# Patient Record
Sex: FEMALE | Race: WHITE | HISPANIC OR LATINO
[De-identification: names, ages, dates, MRNs, and addresses within clinical notes are randomized per-mention and may not be internally consistent; named-entity substitution may affect disease eponyms.]

---

## 2017-01-11 ENCOUNTER — APPOINTMENT (OUTPATIENT)
Dept: ORTHOPEDIC SURGERY | Facility: CLINIC | Age: 82
End: 2017-01-11

## 2017-01-25 ENCOUNTER — APPOINTMENT (OUTPATIENT)
Dept: ORTHOPEDIC SURGERY | Facility: CLINIC | Age: 82
End: 2017-01-25

## 2017-02-22 ENCOUNTER — APPOINTMENT (OUTPATIENT)
Dept: ORTHOPEDIC SURGERY | Facility: CLINIC | Age: 82
End: 2017-02-22

## 2017-02-22 DIAGNOSIS — S52.124A NONDISPLACED FRACTURE OF HEAD OF RIGHT RADIUS, INITIAL ENCOUNTER FOR CLOSED FRACTURE: ICD-10-CM

## 2017-03-22 ENCOUNTER — APPOINTMENT (OUTPATIENT)
Dept: ORTHOPEDIC SURGERY | Facility: CLINIC | Age: 82
End: 2017-03-22

## 2017-03-22 ENCOUNTER — OUTPATIENT (OUTPATIENT)
Dept: OUTPATIENT SERVICES | Facility: HOSPITAL | Age: 82
LOS: 1 days | Discharge: HOME | End: 2017-03-22

## 2017-06-27 DIAGNOSIS — S52.124A NONDISPLACED FRACTURE OF HEAD OF RIGHT RADIUS, INITIAL ENCOUNTER FOR CLOSED FRACTURE: ICD-10-CM

## 2017-07-27 ENCOUNTER — OUTPATIENT (OUTPATIENT)
Dept: OUTPATIENT SERVICES | Facility: HOSPITAL | Age: 82
LOS: 1 days | Discharge: HOME | End: 2017-07-27

## 2017-07-27 DIAGNOSIS — F01.50 VASCULAR DEMENTIA WITHOUT BEHAVIORAL DISTURBANCE: ICD-10-CM

## 2017-10-23 ENCOUNTER — OUTPATIENT (OUTPATIENT)
Dept: OUTPATIENT SERVICES | Facility: HOSPITAL | Age: 82
LOS: 1 days | Discharge: HOME | End: 2017-10-23

## 2017-10-23 DIAGNOSIS — F01.50 VASCULAR DEMENTIA WITHOUT BEHAVIORAL DISTURBANCE: ICD-10-CM

## 2018-02-12 ENCOUNTER — OUTPATIENT (OUTPATIENT)
Dept: OUTPATIENT SERVICES | Facility: HOSPITAL | Age: 83
LOS: 1 days | Discharge: HOME | End: 2018-02-12

## 2018-02-12 DIAGNOSIS — F01.50 VASCULAR DEMENTIA WITHOUT BEHAVIORAL DISTURBANCE: ICD-10-CM

## 2018-04-09 ENCOUNTER — OUTPATIENT (OUTPATIENT)
Dept: OUTPATIENT SERVICES | Facility: HOSPITAL | Age: 83
LOS: 1 days | Discharge: HOME | End: 2018-04-09

## 2018-04-09 DIAGNOSIS — F01.50 VASCULAR DEMENTIA WITHOUT BEHAVIORAL DISTURBANCE: ICD-10-CM

## 2018-07-23 ENCOUNTER — OUTPATIENT (OUTPATIENT)
Dept: OUTPATIENT SERVICES | Facility: HOSPITAL | Age: 83
LOS: 1 days | Discharge: HOME | End: 2018-07-23

## 2018-07-23 DIAGNOSIS — F02.81 DEMENTIA IN OTHER DISEASES CLASSIFIED ELSEWHERE, UNSPECIFIED SEVERITY, WITH BEHAVIORAL DISTURBANCE: ICD-10-CM

## 2018-07-23 DIAGNOSIS — G30.1 ALZHEIMER'S DISEASE WITH LATE ONSET: ICD-10-CM

## 2018-09-04 ENCOUNTER — EMERGENCY (EMERGENCY)
Facility: HOSPITAL | Age: 83
LOS: 1 days | Discharge: HOME | End: 2018-09-04
Attending: EMERGENCY MEDICINE

## 2018-09-04 VITALS
SYSTOLIC BLOOD PRESSURE: 132 MMHG | RESPIRATION RATE: 18 BRPM | TEMPERATURE: 98 F | OXYGEN SATURATION: 99 % | HEART RATE: 70 BPM | DIASTOLIC BLOOD PRESSURE: 59 MMHG

## 2018-09-04 DIAGNOSIS — I10 ESSENTIAL (PRIMARY) HYPERTENSION: ICD-10-CM

## 2018-09-04 DIAGNOSIS — R11.2 NAUSEA WITH VOMITING, UNSPECIFIED: ICD-10-CM

## 2018-09-04 DIAGNOSIS — R19.7 DIARRHEA, UNSPECIFIED: ICD-10-CM

## 2018-09-04 DIAGNOSIS — F03.90 UNSPECIFIED DEMENTIA WITHOUT BEHAVIORAL DISTURBANCE: ICD-10-CM

## 2018-09-04 DIAGNOSIS — R51 HEADACHE: ICD-10-CM

## 2018-09-04 DIAGNOSIS — E11.9 TYPE 2 DIABETES MELLITUS WITHOUT COMPLICATIONS: ICD-10-CM

## 2018-09-04 DIAGNOSIS — N39.0 URINARY TRACT INFECTION, SITE NOT SPECIFIED: ICD-10-CM

## 2018-09-04 DIAGNOSIS — R05 COUGH: ICD-10-CM

## 2018-09-04 RX ORDER — SODIUM CHLORIDE 9 MG/ML
1000 INJECTION INTRAMUSCULAR; INTRAVENOUS; SUBCUTANEOUS
Refills: 0 | Status: DISCONTINUED | OUTPATIENT
Start: 2018-09-04 | End: 2018-09-10

## 2018-09-04 RX ORDER — ACETAMINOPHEN 500 MG
650 TABLET ORAL ONCE
Refills: 0 | Status: COMPLETED | OUTPATIENT
Start: 2018-09-04 | End: 2018-09-04

## 2018-09-04 NOTE — ED PROVIDER NOTE - NS ED ROS FT
Constitutional: (-) fever, (-) chills, (-) weight loss  Eyes/ENT: (-) blurry vision, (-) epistaxis  Cardiovascular: (-) chest pain, (-) syncope  Respiratory: (+)mild non-productive cough, (-) shortness of breath  Gastrointestinal: (+) nausea/ vomiting, (+) diarrhea, (-) abdominal pain  :(-) dysuria or hematuria  Musculoskeletal: (-) neck pain, (-) back pain, (-) joint pain  Integumentary: (-) rash, (-) edema  Neurological: (+) mild headache, (-) altered mental status  Psychiatric: (-) hallucinations  Allergic/Immunologic: (-) pruritus

## 2018-09-04 NOTE — ED PROVIDER NOTE - PHYSICAL EXAMINATION
Vital Signs: I have reviewed the initial vital signs.  Constitutional: well-nourished, appears stated age, no acute distress, smiling and pleasant  HEENT: PERRL, mmm, nml phonation  Cardiovascular: regular rate, regular rhythm, well-perfused extremities, distal pulses intact  Respiratory: unlabored respiratory effort, b/l dry inspiratory crackles, speaking full sentences  Gastrointestinal: soft, non-tender abdomen, no pulsatile mass, no CVA ttp, BS present in all quadrants  Musculoskeletal: supple neck, no lower extremity edema, no midline spine ttp, FROM at all joints  Integumentary: warm, dry, no rash  Neurologic: awake, alert, cranial nerves II-XII grossly intact, extremities’ motor and sensory functions grossly intact,  Psychiatric: appropriate mood, appropriate affect, smiling, cooperative, making jokes

## 2018-09-04 NOTE — ED PROVIDER NOTE - PROGRESS NOTE DETAILS
No episoeds of vomiting in ED, patient tolerated PO, found to have UTI ( straight cath specimen); results of all testgs were d/w the pfa,desiree, copies of tests were given to her,  Instructed to follow up with PMD in 2-3 days, strict return precautions given.  They verbalized understanding and are amenable with the plan.  Given opportunity to ask questions.

## 2018-09-04 NOTE — ED PROVIDER NOTE - OBJECTIVE STATEMENT
89 yo female h/o HTN, DM, dementia, UTI about 3 months ago brought here by her family for evaluation of vomiting,  According to family patient has been in her usual state of health until yesterday when she became a little less active and not as hungry.  This morning had a small breakfast and in the afternoon had Glucerna with her evening medications, shortly thereafter developed nausea and had an episode of NBNB emesis.  in addition, family reports she had a few loose BM today.  Patient reports mild headache, but denies fever, chills, SOB, CP, SOB, abdominal or back pain, no focal weakness or paresthesias.  As per family  she is ate her baseline mental status.  Will check labs, give fluids, ECg, reassess.  Family is amenable with the plan.

## 2018-09-04 NOTE — ED PROVIDER NOTE - MEDICAL DECISION MAKING DETAILS
89 yo female with 1 episode of emesis at home, found to have UTI ,  Stable during a period of observation, d/c home with family in a stable condition.

## 2018-09-05 LAB
ALBUMIN SERPL ELPH-MCNC: 4.5 G/DL — SIGNIFICANT CHANGE UP (ref 3.5–5.2)
ALP SERPL-CCNC: 99 U/L — SIGNIFICANT CHANGE UP (ref 30–115)
ALT FLD-CCNC: 30 U/L — SIGNIFICANT CHANGE UP (ref 0–41)
ANION GAP SERPL CALC-SCNC: 19 MMOL/L — HIGH (ref 7–14)
APPEARANCE UR: ABNORMAL
AST SERPL-CCNC: 42 U/L — HIGH (ref 0–41)
BACTERIA # UR AUTO: ABNORMAL /HPF
BASOPHILS # BLD AUTO: 0.06 K/UL — SIGNIFICANT CHANGE UP (ref 0–0.2)
BASOPHILS NFR BLD AUTO: 0.6 % — SIGNIFICANT CHANGE UP (ref 0–1)
BILIRUB DIRECT SERPL-MCNC: <0.2 MG/DL — SIGNIFICANT CHANGE UP (ref 0–0.2)
BILIRUB INDIRECT FLD-MCNC: >0.2 MG/DL — SIGNIFICANT CHANGE UP (ref 0.2–1.2)
BILIRUB SERPL-MCNC: 0.4 MG/DL — SIGNIFICANT CHANGE UP (ref 0.2–1.2)
BILIRUB UR-MCNC: NEGATIVE — SIGNIFICANT CHANGE UP
BUN SERPL-MCNC: 26 MG/DL — HIGH (ref 10–20)
CALCIUM SERPL-MCNC: 9.8 MG/DL — SIGNIFICANT CHANGE UP (ref 8.5–10.1)
CHLORIDE SERPL-SCNC: 96 MMOL/L — LOW (ref 98–110)
CO2 SERPL-SCNC: 22 MMOL/L — SIGNIFICANT CHANGE UP (ref 17–32)
COLOR SPEC: YELLOW — SIGNIFICANT CHANGE UP
CREAT SERPL-MCNC: 1.1 MG/DL — SIGNIFICANT CHANGE UP (ref 0.7–1.5)
DIFF PNL FLD: ABNORMAL
EOSINOPHIL # BLD AUTO: 0.11 K/UL — SIGNIFICANT CHANGE UP (ref 0–0.7)
EOSINOPHIL NFR BLD AUTO: 1.1 % — SIGNIFICANT CHANGE UP (ref 0–8)
EPI CELLS # UR: ABNORMAL /HPF
GLUCOSE SERPL-MCNC: 113 MG/DL — HIGH (ref 70–99)
GLUCOSE UR QL: NEGATIVE MG/DL — SIGNIFICANT CHANGE UP
HCT VFR BLD CALC: 35.7 % — LOW (ref 37–47)
HGB BLD-MCNC: 11.6 G/DL — LOW (ref 12–16)
IMM GRANULOCYTES NFR BLD AUTO: 0.7 % — HIGH (ref 0.1–0.3)
KETONES UR-MCNC: NEGATIVE — SIGNIFICANT CHANGE UP
LEUKOCYTE ESTERASE UR-ACNC: ABNORMAL
LIDOCAIN IGE QN: 63 U/L — HIGH (ref 7–60)
LYMPHOCYTES # BLD AUTO: 2.97 K/UL — SIGNIFICANT CHANGE UP (ref 1.2–3.4)
LYMPHOCYTES # BLD AUTO: 28.5 % — SIGNIFICANT CHANGE UP (ref 20.5–51.1)
MCHC RBC-ENTMCNC: 28.3 PG — SIGNIFICANT CHANGE UP (ref 27–31)
MCHC RBC-ENTMCNC: 32.5 G/DL — SIGNIFICANT CHANGE UP (ref 32–37)
MCV RBC AUTO: 87.1 FL — SIGNIFICANT CHANGE UP (ref 81–99)
MONOCYTES # BLD AUTO: 0.83 K/UL — HIGH (ref 0.1–0.6)
MONOCYTES NFR BLD AUTO: 8 % — SIGNIFICANT CHANGE UP (ref 1.7–9.3)
NEUTROPHILS # BLD AUTO: 6.39 K/UL — SIGNIFICANT CHANGE UP (ref 1.4–6.5)
NEUTROPHILS NFR BLD AUTO: 61.1 % — SIGNIFICANT CHANGE UP (ref 42.2–75.2)
NITRITE UR-MCNC: NEGATIVE — SIGNIFICANT CHANGE UP
NRBC # BLD: 0 /100 WBCS — SIGNIFICANT CHANGE UP (ref 0–0)
PH UR: 6 — SIGNIFICANT CHANGE UP (ref 5–8)
PLATELET # BLD AUTO: 187 K/UL — SIGNIFICANT CHANGE UP (ref 130–400)
POTASSIUM SERPL-MCNC: 5.2 MMOL/L — HIGH (ref 3.5–5)
POTASSIUM SERPL-SCNC: 5.2 MMOL/L — HIGH (ref 3.5–5)
PROT SERPL-MCNC: 7.9 G/DL — SIGNIFICANT CHANGE UP (ref 6–8)
PROT UR-MCNC: ABNORMAL MG/DL
RBC # BLD: 4.1 M/UL — LOW (ref 4.2–5.4)
RBC # FLD: 15.4 % — HIGH (ref 11.5–14.5)
RBC CASTS # UR COMP ASSIST: SIGNIFICANT CHANGE UP /HPF
SODIUM SERPL-SCNC: 137 MMOL/L — SIGNIFICANT CHANGE UP (ref 135–146)
SP GR SPEC: 1.02 — SIGNIFICANT CHANGE UP (ref 1.01–1.03)
TROPONIN T SERPL-MCNC: <0.01 NG/ML — SIGNIFICANT CHANGE UP
UROBILINOGEN FLD QL: 1 MG/DL (ref 0.2–0.2)
WBC # BLD: 10.43 K/UL — SIGNIFICANT CHANGE UP (ref 4.8–10.8)
WBC # FLD AUTO: 10.43 K/UL — SIGNIFICANT CHANGE UP (ref 4.8–10.8)
WBC UR QL: >50 /HPF

## 2018-09-05 RX ORDER — CEFPODOXIME PROXETIL 100 MG
1 TABLET ORAL
Qty: 14 | Refills: 0
Start: 2018-09-05 | End: 2018-09-11

## 2018-09-05 RX ADMIN — Medication 650 MILLIGRAM(S): at 01:00

## 2018-09-05 RX ADMIN — SODIUM CHLORIDE 100 MILLILITER(S): 9 INJECTION INTRAMUSCULAR; INTRAVENOUS; SUBCUTANEOUS at 01:11

## 2018-09-05 NOTE — ED ADULT NURSE NOTE - NSIMPLEMENTINTERV_GEN_ALL_ED
Implemented All Fall with Harm Risk Interventions:  [place] to call system. Call bell, personal items and telephone within reach. Instruct patient to call for assistance. Room bathroom lighting operational. Non-slip footwear when patient is off stretcher. Physically safe environment: no spills, clutter or unnecessary equipment. Stretcher in lowest position, wheels locked, appropriate side rails in place. Provide visual cue, wrist band, yellow gown, etc. Monitor gait and stability. Monitor for mental status changes and reorient to person, place, and time. Review medications for side effects contributing to fall risk. Reinforce activity limits and safety measures with patient and family. Provide visual clues: red socks.

## 2018-09-05 NOTE — ED ADULT NURSE REASSESSMENT NOTE - NS ED NURSE REASSESS COMMENT FT1
pt combative. unable to straight cath. bed pan commode provided. pt refusing to use. er md aware. cbc drawn and sent

## 2018-11-12 ENCOUNTER — OUTPATIENT (OUTPATIENT)
Dept: OUTPATIENT SERVICES | Facility: HOSPITAL | Age: 83
LOS: 1 days | Discharge: HOME | End: 2018-11-12

## 2018-11-12 DIAGNOSIS — G30.1 ALZHEIMER'S DISEASE WITH LATE ONSET: ICD-10-CM

## 2018-11-12 DIAGNOSIS — F02.81 DEMENTIA IN OTHER DISEASES CLASSIFIED ELSEWHERE, UNSPECIFIED SEVERITY, WITH BEHAVIORAL DISTURBANCE: ICD-10-CM

## 2019-01-01 ENCOUNTER — APPOINTMENT (OUTPATIENT)
Dept: OBGYN | Facility: CLINIC | Age: 84
End: 2019-01-01
Payer: MEDICARE

## 2019-01-01 ENCOUNTER — OUTPATIENT (OUTPATIENT)
Dept: OUTPATIENT SERVICES | Facility: HOSPITAL | Age: 84
LOS: 1 days | Discharge: HOME | End: 2019-01-01
Payer: COMMERCIAL

## 2019-01-01 VITALS
RESPIRATION RATE: 16 BRPM | HEIGHT: 56 IN | HEART RATE: 85 BPM | WEIGHT: 92 LBS | BODY MASS INDEX: 20.7 KG/M2 | DIASTOLIC BLOOD PRESSURE: 65 MMHG | SYSTOLIC BLOOD PRESSURE: 139 MMHG

## 2019-01-01 DIAGNOSIS — B37.3 CANDIDIASIS OF VULVA AND VAGINA: ICD-10-CM

## 2019-01-01 DIAGNOSIS — Z86.39 PERSONAL HISTORY OF OTHER ENDOCRINE, NUTRITIONAL AND METABOLIC DISEASE: ICD-10-CM

## 2019-01-01 DIAGNOSIS — N89.8 OTHER SPECIFIED NONINFLAMMATORY DISORDERS OF VAGINA: ICD-10-CM

## 2019-01-01 DIAGNOSIS — F02.81 DEMENTIA IN OTHER DISEASES CLASSIFIED ELSEWHERE, UNSPECIFIED SEVERITY, WITH BEHAVIORAL DISTURBANCE: ICD-10-CM

## 2019-01-01 DIAGNOSIS — Z86.79 PERSONAL HISTORY OF OTHER DISEASES OF THE CIRCULATORY SYSTEM: ICD-10-CM

## 2019-01-01 DIAGNOSIS — G30.1 ALZHEIMER'S DISEASE WITH LATE ONSET: ICD-10-CM

## 2019-01-01 PROCEDURE — 99203 OFFICE O/P NEW LOW 30 MIN: CPT

## 2019-01-01 PROCEDURE — 99213 OFFICE O/P EST LOW 20 MIN: CPT

## 2019-01-01 RX ORDER — FLUCONAZOLE 150 MG/1
150 TABLET ORAL DAILY
Qty: 1 | Refills: 0 | Status: ACTIVE | COMMUNITY
Start: 2019-01-01 | End: 1900-01-01

## 2019-02-05 ENCOUNTER — OUTPATIENT (OUTPATIENT)
Dept: OUTPATIENT SERVICES | Facility: HOSPITAL | Age: 84
LOS: 1 days | Discharge: HOME | End: 2019-02-05

## 2019-02-05 DIAGNOSIS — G30.1 ALZHEIMER'S DISEASE WITH LATE ONSET: ICD-10-CM

## 2019-02-05 DIAGNOSIS — F02.81 DEMENTIA IN OTHER DISEASES CLASSIFIED ELSEWHERE, UNSPECIFIED SEVERITY, WITH BEHAVIORAL DISTURBANCE: ICD-10-CM

## 2019-10-01 PROBLEM — N89.8 DISCHARGE FROM THE VAGINA: Status: ACTIVE | Noted: 2019-01-01

## 2019-10-01 PROBLEM — Z86.79 HISTORY OF CHRONIC HYPERTENSION: Status: RESOLVED | Noted: 2019-01-01 | Resolved: 2019-01-01

## 2019-10-01 PROBLEM — Z86.39 HISTORY OF TYPE 2 DIABETES MELLITUS: Status: RESOLVED | Noted: 2019-01-01 | Resolved: 2019-01-01

## 2019-10-01 NOTE — CHIEF COMPLAINT
[Initial Visit] : initial GYN visit [FreeTextEntry1] : JAROCHO TRACEY is a 89 year female\par with possible discharge.

## 2020-01-01 ENCOUNTER — INPATIENT (INPATIENT)
Facility: HOSPITAL | Age: 85
LOS: 7 days | End: 2020-05-03
Attending: INTERNAL MEDICINE | Admitting: INTERNAL MEDICINE
Payer: MEDICAID

## 2020-01-01 VITALS
OXYGEN SATURATION: 83 % | TEMPERATURE: 101 F | HEART RATE: 115 BPM | RESPIRATION RATE: 40 BRPM | SYSTOLIC BLOOD PRESSURE: 102 MMHG | DIASTOLIC BLOOD PRESSURE: 55 MMHG

## 2020-01-01 VITALS
DIASTOLIC BLOOD PRESSURE: 49 MMHG | HEART RATE: 82 BPM | SYSTOLIC BLOOD PRESSURE: 112 MMHG | RESPIRATION RATE: 19 BRPM | TEMPERATURE: 97 F | OXYGEN SATURATION: 100 %

## 2020-01-01 DIAGNOSIS — Z98.49 CATARACT EXTRACTION STATUS, UNSPECIFIED EYE: Chronic | ICD-10-CM

## 2020-01-01 DIAGNOSIS — N18.3 CHRONIC KIDNEY DISEASE, STAGE 3 (MODERATE): ICD-10-CM

## 2020-01-01 LAB
ALBUMIN SERPL ELPH-MCNC: 1.9 G/DL — LOW (ref 3.5–5.2)
ALBUMIN SERPL ELPH-MCNC: 1.9 G/DL — LOW (ref 3.5–5.2)
ALBUMIN SERPL ELPH-MCNC: 2.2 G/DL — LOW (ref 3.5–5.2)
ALBUMIN SERPL ELPH-MCNC: 2.4 G/DL — LOW (ref 3.5–5.2)
ALBUMIN SERPL ELPH-MCNC: 2.5 G/DL — LOW (ref 3.5–5.2)
ALBUMIN SERPL ELPH-MCNC: 2.8 G/DL — LOW (ref 3.5–5.2)
ALBUMIN SERPL ELPH-MCNC: 2.9 G/DL — LOW (ref 3.5–5.2)
ALBUMIN SERPL ELPH-MCNC: 3.1 G/DL — LOW (ref 3.5–5.2)
ALBUMIN SERPL ELPH-MCNC: 4.1 G/DL — SIGNIFICANT CHANGE UP (ref 3.5–5.2)
ALLERGY+IMMUNOLOGY DIAG STUDY NOTE: SIGNIFICANT CHANGE UP
ALP SERPL-CCNC: 108 U/L — SIGNIFICANT CHANGE UP (ref 30–115)
ALP SERPL-CCNC: 121 U/L — HIGH (ref 30–115)
ALP SERPL-CCNC: 137 U/L — HIGH (ref 30–115)
ALP SERPL-CCNC: 76 U/L — SIGNIFICANT CHANGE UP (ref 30–115)
ALP SERPL-CCNC: 79 U/L — SIGNIFICANT CHANGE UP (ref 30–115)
ALP SERPL-CCNC: 81 U/L — SIGNIFICANT CHANGE UP (ref 30–115)
ALP SERPL-CCNC: 83 U/L — SIGNIFICANT CHANGE UP (ref 30–115)
ALP SERPL-CCNC: 93 U/L — SIGNIFICANT CHANGE UP (ref 30–115)
ALP SERPL-CCNC: 97 U/L — SIGNIFICANT CHANGE UP (ref 30–115)
ALP SERPL-CCNC: 97 U/L — SIGNIFICANT CHANGE UP (ref 30–115)
ALP SERPL-CCNC: 98 U/L — SIGNIFICANT CHANGE UP (ref 30–115)
ALT FLD-CCNC: 103 U/L — HIGH (ref 0–41)
ALT FLD-CCNC: 122 U/L — HIGH (ref 0–41)
ALT FLD-CCNC: 191 U/L — HIGH (ref 0–41)
ALT FLD-CCNC: 52 U/L — HIGH (ref 0–41)
ALT FLD-CCNC: 64 U/L — HIGH (ref 0–41)
ALT FLD-CCNC: 71 U/L — HIGH (ref 0–41)
ALT FLD-CCNC: 73 U/L — HIGH (ref 0–41)
ALT FLD-CCNC: 75 U/L — HIGH (ref 0–41)
ALT FLD-CCNC: 77 U/L — HIGH (ref 0–41)
ALT FLD-CCNC: 92 U/L — HIGH (ref 0–41)
ALT FLD-CCNC: 95 U/L — HIGH (ref 0–41)
ANA TITR SER: NEGATIVE — SIGNIFICANT CHANGE UP
ANION GAP SERPL CALC-SCNC: 11 MMOL/L — SIGNIFICANT CHANGE UP (ref 7–14)
ANION GAP SERPL CALC-SCNC: 14 MMOL/L — SIGNIFICANT CHANGE UP (ref 7–14)
ANION GAP SERPL CALC-SCNC: 15 MMOL/L — HIGH (ref 7–14)
ANION GAP SERPL CALC-SCNC: 15 MMOL/L — HIGH (ref 7–14)
ANION GAP SERPL CALC-SCNC: 16 MMOL/L — HIGH (ref 7–14)
ANION GAP SERPL CALC-SCNC: 17 MMOL/L — HIGH (ref 7–14)
ANION GAP SERPL CALC-SCNC: 17 MMOL/L — HIGH (ref 7–14)
ANION GAP SERPL CALC-SCNC: 19 MMOL/L — HIGH (ref 7–14)
ANION GAP SERPL CALC-SCNC: 20 MMOL/L — HIGH (ref 7–14)
ANION GAP SERPL CALC-SCNC: 20 MMOL/L — HIGH (ref 7–14)
ANION GAP SERPL CALC-SCNC: 21 MMOL/L — HIGH (ref 7–14)
ANION GAP SERPL CALC-SCNC: 27 MMOL/L — HIGH (ref 7–14)
ANION GAP SERPL CALC-SCNC: 35 MMOL/L — HIGH (ref 7–14)
APAP SERPL-MCNC: <5 UG/ML — LOW (ref 10–30)
APPEARANCE UR: CLEAR — SIGNIFICANT CHANGE UP
APTT BLD: 21.7 SEC — CRITICAL LOW (ref 27–39.2)
APTT BLD: 29.5 SEC — SIGNIFICANT CHANGE UP (ref 27–39.2)
APTT BLD: 31.2 SEC — SIGNIFICANT CHANGE UP (ref 27–39.2)
APTT BLD: 35.2 SEC — SIGNIFICANT CHANGE UP (ref 27–39.2)
APTT BLD: 48.1 SEC — HIGH (ref 27–39.2)
APTT BLD: 53.1 SEC — HIGH (ref 27–39.2)
AST SERPL-CCNC: 104 U/L — HIGH (ref 0–41)
AST SERPL-CCNC: 113 U/L — HIGH (ref 0–41)
AST SERPL-CCNC: 118 U/L — HIGH (ref 0–41)
AST SERPL-CCNC: 135 U/L — HIGH (ref 0–41)
AST SERPL-CCNC: 138 U/L — HIGH (ref 0–41)
AST SERPL-CCNC: 144 U/L — HIGH (ref 0–41)
AST SERPL-CCNC: 157 U/L — HIGH (ref 0–41)
AST SERPL-CCNC: 169 U/L — HIGH (ref 0–41)
AST SERPL-CCNC: 205 U/L — HIGH (ref 0–41)
AST SERPL-CCNC: 287 U/L — HIGH (ref 0–41)
AST SERPL-CCNC: 67 U/L — HIGH (ref 0–41)
B-OH-BUTYR SERPL-SCNC: 0.4 MMOL/L — SIGNIFICANT CHANGE UP
BACTERIA # UR AUTO: NEGATIVE — SIGNIFICANT CHANGE UP
BASE EXCESS BLDA CALC-SCNC: 2.4 MMOL/L — HIGH (ref -2–2)
BASOPHILS # BLD AUTO: 0 K/UL — SIGNIFICANT CHANGE UP (ref 0–0.2)
BASOPHILS # BLD AUTO: 0.01 K/UL — SIGNIFICANT CHANGE UP (ref 0–0.2)
BASOPHILS # BLD AUTO: 0.02 K/UL — SIGNIFICANT CHANGE UP (ref 0–0.2)
BASOPHILS # BLD AUTO: 0.02 K/UL — SIGNIFICANT CHANGE UP (ref 0–0.2)
BASOPHILS # BLD AUTO: 0.03 K/UL — SIGNIFICANT CHANGE UP (ref 0–0.2)
BASOPHILS # BLD AUTO: 0.04 K/UL — SIGNIFICANT CHANGE UP (ref 0–0.2)
BASOPHILS # BLD AUTO: 0.08 K/UL — SIGNIFICANT CHANGE UP (ref 0–0.2)
BASOPHILS NFR BLD AUTO: 0 % — SIGNIFICANT CHANGE UP (ref 0–1)
BASOPHILS NFR BLD AUTO: 0.1 % — SIGNIFICANT CHANGE UP (ref 0–1)
BASOPHILS NFR BLD AUTO: 0.2 % — SIGNIFICANT CHANGE UP (ref 0–1)
BASOPHILS NFR BLD AUTO: 0.3 % — SIGNIFICANT CHANGE UP (ref 0–1)
BASOPHILS NFR BLD AUTO: 0.5 % — SIGNIFICANT CHANGE UP (ref 0–1)
BASOPHILS NFR BLD AUTO: 0.6 % — SIGNIFICANT CHANGE UP (ref 0–1)
BILIRUB SERPL-MCNC: 0.5 MG/DL — SIGNIFICANT CHANGE UP (ref 0.2–1.2)
BILIRUB SERPL-MCNC: 0.5 MG/DL — SIGNIFICANT CHANGE UP (ref 0.2–1.2)
BILIRUB SERPL-MCNC: 0.6 MG/DL — SIGNIFICANT CHANGE UP (ref 0.2–1.2)
BILIRUB SERPL-MCNC: 0.7 MG/DL — SIGNIFICANT CHANGE UP (ref 0.2–1.2)
BILIRUB SERPL-MCNC: 0.7 MG/DL — SIGNIFICANT CHANGE UP (ref 0.2–1.2)
BILIRUB SERPL-MCNC: 1.1 MG/DL — SIGNIFICANT CHANGE UP (ref 0.2–1.2)
BILIRUB UR-MCNC: NEGATIVE — SIGNIFICANT CHANGE UP
BLD GP AB SCN SERPL QL: SIGNIFICANT CHANGE UP
BUN SERPL-MCNC: 103 MG/DL — CRITICAL HIGH (ref 10–20)
BUN SERPL-MCNC: 106 MG/DL — CRITICAL HIGH (ref 10–20)
BUN SERPL-MCNC: 114 MG/DL — CRITICAL HIGH (ref 10–20)
BUN SERPL-MCNC: 114 MG/DL — CRITICAL HIGH (ref 10–20)
BUN SERPL-MCNC: 117 MG/DL — CRITICAL HIGH (ref 10–20)
BUN SERPL-MCNC: 30 MG/DL — HIGH (ref 10–20)
BUN SERPL-MCNC: 35 MG/DL — HIGH (ref 10–20)
BUN SERPL-MCNC: 43 MG/DL — HIGH (ref 10–20)
BUN SERPL-MCNC: 43 MG/DL — HIGH (ref 10–20)
BUN SERPL-MCNC: 48 MG/DL — HIGH (ref 10–20)
BUN SERPL-MCNC: 57 MG/DL — HIGH (ref 10–20)
BUN SERPL-MCNC: 59 MG/DL — HIGH (ref 10–20)
BUN SERPL-MCNC: 59 MG/DL — HIGH (ref 10–20)
BUN SERPL-MCNC: 69 MG/DL — CRITICAL HIGH (ref 10–20)
BUN SERPL-MCNC: 80 MG/DL — CRITICAL HIGH (ref 10–20)
CALCIUM SERPL-MCNC: 7.2 MG/DL — LOW (ref 8.5–10.1)
CALCIUM SERPL-MCNC: 7.2 MG/DL — LOW (ref 8.5–10.1)
CALCIUM SERPL-MCNC: 7.3 MG/DL — LOW (ref 8.5–10.1)
CALCIUM SERPL-MCNC: 7.5 MG/DL — LOW (ref 8.5–10.1)
CALCIUM SERPL-MCNC: 7.5 MG/DL — LOW (ref 8.5–10.1)
CALCIUM SERPL-MCNC: 7.8 MG/DL — LOW (ref 8.5–10.1)
CALCIUM SERPL-MCNC: 8 MG/DL — LOW (ref 8.5–10.1)
CALCIUM SERPL-MCNC: 8.4 MG/DL — LOW (ref 8.5–10.1)
CALCIUM SERPL-MCNC: 8.6 MG/DL — SIGNIFICANT CHANGE UP (ref 8.5–10.1)
CALCIUM SERPL-MCNC: 9 MG/DL — SIGNIFICANT CHANGE UP (ref 8.5–10.1)
CALCIUM SERPL-MCNC: 9.1 MG/DL — SIGNIFICANT CHANGE UP (ref 8.5–10.1)
CALCIUM SERPL-MCNC: 9.5 MG/DL — SIGNIFICANT CHANGE UP (ref 8.5–10.1)
CERULOPLASMIN SERPL-MCNC: 32 MG/DL — SIGNIFICANT CHANGE UP (ref 16–45)
CHLORIDE SERPL-SCNC: 112 MMOL/L — HIGH (ref 98–110)
CHLORIDE SERPL-SCNC: 117 MMOL/L — HIGH (ref 98–110)
CHLORIDE SERPL-SCNC: 118 MMOL/L — HIGH (ref 98–110)
CHLORIDE SERPL-SCNC: 119 MMOL/L — HIGH (ref 98–110)
CHLORIDE SERPL-SCNC: 119 MMOL/L — HIGH (ref 98–110)
CHLORIDE SERPL-SCNC: 120 MMOL/L — HIGH (ref 98–110)
CHLORIDE SERPL-SCNC: 122 MMOL/L — HIGH (ref 98–110)
CK MB CFR SERPL CALC: 10.8 NG/ML — HIGH (ref 0.6–6.3)
CK SERPL-CCNC: 1184 U/L — HIGH (ref 0–225)
CO2 SERPL-SCNC: 17 MMOL/L — SIGNIFICANT CHANGE UP (ref 17–32)
CO2 SERPL-SCNC: 17 MMOL/L — SIGNIFICANT CHANGE UP (ref 17–32)
CO2 SERPL-SCNC: 19 MMOL/L — SIGNIFICANT CHANGE UP (ref 17–32)
CO2 SERPL-SCNC: 20 MMOL/L — SIGNIFICANT CHANGE UP (ref 17–32)
CO2 SERPL-SCNC: 20 MMOL/L — SIGNIFICANT CHANGE UP (ref 17–32)
CO2 SERPL-SCNC: 21 MMOL/L — SIGNIFICANT CHANGE UP (ref 17–32)
CO2 SERPL-SCNC: 25 MMOL/L — SIGNIFICANT CHANGE UP (ref 17–32)
CO2 SERPL-SCNC: 26 MMOL/L — SIGNIFICANT CHANGE UP (ref 17–32)
CO2 SERPL-SCNC: 27 MMOL/L — SIGNIFICANT CHANGE UP (ref 17–32)
COLOR SPEC: YELLOW — SIGNIFICANT CHANGE UP
CREAT ?TM UR-MCNC: 95 MG/DL — SIGNIFICANT CHANGE UP
CREAT SERPL-MCNC: 0.8 MG/DL — SIGNIFICANT CHANGE UP (ref 0.7–1.5)
CREAT SERPL-MCNC: 0.9 MG/DL — SIGNIFICANT CHANGE UP (ref 0.7–1.5)
CREAT SERPL-MCNC: 1 MG/DL — SIGNIFICANT CHANGE UP (ref 0.7–1.5)
CREAT SERPL-MCNC: 1.1 MG/DL — SIGNIFICANT CHANGE UP (ref 0.7–1.5)
CREAT SERPL-MCNC: 1.5 MG/DL — SIGNIFICANT CHANGE UP (ref 0.7–1.5)
CREAT SERPL-MCNC: 1.7 MG/DL — HIGH (ref 0.7–1.5)
CREAT SERPL-MCNC: 1.9 MG/DL — HIGH (ref 0.7–1.5)
CREAT SERPL-MCNC: 2.1 MG/DL — HIGH (ref 0.7–1.5)
CREAT SERPL-MCNC: 2.8 MG/DL — HIGH (ref 0.7–1.5)
CRP SERPL-MCNC: 0.84 MG/DL — HIGH (ref 0–0.4)
CULTURE RESULTS: NO GROWTH — SIGNIFICANT CHANGE UP
CULTURE RESULTS: SIGNIFICANT CHANGE UP
CULTURE RESULTS: SIGNIFICANT CHANGE UP
DIFF PNL FLD: ABNORMAL
DIR ANTIGLOB POLYSPECIFIC INTERPRETATION: SIGNIFICANT CHANGE UP
EOSINOPHIL # BLD AUTO: 0 K/UL — SIGNIFICANT CHANGE UP (ref 0–0.7)
EOSINOPHIL # BLD AUTO: 0.01 K/UL — SIGNIFICANT CHANGE UP (ref 0–0.7)
EOSINOPHIL NFR BLD AUTO: 0 % — SIGNIFICANT CHANGE UP (ref 0–8)
EOSINOPHIL NFR BLD AUTO: 0.1 % — SIGNIFICANT CHANGE UP (ref 0–8)
EOSINOPHIL NFR BLD AUTO: 0.2 % — SIGNIFICANT CHANGE UP (ref 0–8)
EPI CELLS # UR: 6 /HPF — HIGH (ref 0–5)
FERRITIN SERPL-MCNC: 676 NG/ML — HIGH (ref 15–150)
FIBRINOGEN PPP-MCNC: 575 MG/DL — HIGH (ref 204.4–570.6)
GAS PNL BLDV: SIGNIFICANT CHANGE UP
GLUCOSE BLDC GLUCOMTR-MCNC: 103 MG/DL — HIGH (ref 70–99)
GLUCOSE BLDC GLUCOMTR-MCNC: 125 MG/DL — HIGH (ref 70–99)
GLUCOSE BLDC GLUCOMTR-MCNC: 128 MG/DL — HIGH (ref 70–99)
GLUCOSE BLDC GLUCOMTR-MCNC: 147 MG/DL — HIGH (ref 70–99)
GLUCOSE BLDC GLUCOMTR-MCNC: 165 MG/DL — HIGH (ref 70–99)
GLUCOSE BLDC GLUCOMTR-MCNC: 170 MG/DL — HIGH (ref 70–99)
GLUCOSE BLDC GLUCOMTR-MCNC: 176 MG/DL — HIGH (ref 70–99)
GLUCOSE BLDC GLUCOMTR-MCNC: 179 MG/DL — HIGH (ref 70–99)
GLUCOSE BLDC GLUCOMTR-MCNC: 180 MG/DL — HIGH (ref 70–99)
GLUCOSE BLDC GLUCOMTR-MCNC: 188 MG/DL — HIGH (ref 70–99)
GLUCOSE BLDC GLUCOMTR-MCNC: 188 MG/DL — HIGH (ref 70–99)
GLUCOSE BLDC GLUCOMTR-MCNC: 189 MG/DL — HIGH (ref 70–99)
GLUCOSE BLDC GLUCOMTR-MCNC: 192 MG/DL — HIGH (ref 70–99)
GLUCOSE BLDC GLUCOMTR-MCNC: 193 MG/DL — HIGH (ref 70–99)
GLUCOSE BLDC GLUCOMTR-MCNC: 214 MG/DL — HIGH (ref 70–99)
GLUCOSE BLDC GLUCOMTR-MCNC: 228 MG/DL — HIGH (ref 70–99)
GLUCOSE BLDC GLUCOMTR-MCNC: 243 MG/DL — HIGH (ref 70–99)
GLUCOSE BLDC GLUCOMTR-MCNC: 91 MG/DL — SIGNIFICANT CHANGE UP (ref 70–99)
GLUCOSE BLDC GLUCOMTR-MCNC: 95 MG/DL — SIGNIFICANT CHANGE UP (ref 70–99)
GLUCOSE BLDC GLUCOMTR-MCNC: 99 MG/DL — SIGNIFICANT CHANGE UP (ref 70–99)
GLUCOSE SERPL-MCNC: 140 MG/DL — HIGH (ref 70–99)
GLUCOSE SERPL-MCNC: 145 MG/DL — HIGH (ref 70–99)
GLUCOSE SERPL-MCNC: 146 MG/DL — HIGH (ref 70–99)
GLUCOSE SERPL-MCNC: 147 MG/DL — HIGH (ref 70–99)
GLUCOSE SERPL-MCNC: 159 MG/DL — HIGH (ref 70–99)
GLUCOSE SERPL-MCNC: 171 MG/DL — HIGH (ref 70–99)
GLUCOSE SERPL-MCNC: 173 MG/DL — HIGH (ref 70–99)
GLUCOSE SERPL-MCNC: 185 MG/DL — HIGH (ref 70–99)
GLUCOSE SERPL-MCNC: 200 MG/DL — HIGH (ref 70–99)
GLUCOSE SERPL-MCNC: 203 MG/DL — HIGH (ref 70–99)
GLUCOSE SERPL-MCNC: 217 MG/DL — HIGH (ref 70–99)
GLUCOSE SERPL-MCNC: 247 MG/DL — HIGH (ref 70–99)
GLUCOSE SERPL-MCNC: 264 MG/DL — HIGH (ref 70–99)
GLUCOSE SERPL-MCNC: 339 MG/DL — HIGH (ref 70–99)
GLUCOSE SERPL-MCNC: 356 MG/DL — HIGH (ref 70–99)
GLUCOSE UR QL: NEGATIVE — SIGNIFICANT CHANGE UP
HAV IGM SER-ACNC: SIGNIFICANT CHANGE UP
HBV CORE IGM SER-ACNC: SIGNIFICANT CHANGE UP
HBV SURFACE AG SER-ACNC: SIGNIFICANT CHANGE UP
HCO3 BLDA-SCNC: 26 MMOL/L — SIGNIFICANT CHANGE UP (ref 23–27)
HCT VFR BLD CALC: 23.9 % — LOW (ref 37–47)
HCT VFR BLD CALC: 29.5 % — LOW (ref 37–47)
HCT VFR BLD CALC: 30.2 % — LOW (ref 37–47)
HCT VFR BLD CALC: 30.4 % — LOW (ref 37–47)
HCT VFR BLD CALC: 30.5 % — LOW (ref 37–47)
HCT VFR BLD CALC: 30.8 % — LOW (ref 37–47)
HCT VFR BLD CALC: 33.6 % — LOW (ref 37–47)
HCT VFR BLD CALC: 38.9 % — SIGNIFICANT CHANGE UP (ref 37–47)
HCT VFR BLD CALC: 44.2 % — SIGNIFICANT CHANGE UP (ref 37–47)
HCT VFR BLD CALC: 45.6 % — SIGNIFICANT CHANGE UP (ref 37–47)
HCT VFR BLD CALC: 57 % — HIGH (ref 37–47)
HCV AB S/CO SERPL IA: 0.75 S/CO — SIGNIFICANT CHANGE UP (ref 0–0.99)
HCV AB SERPL-IMP: SIGNIFICANT CHANGE UP
HEPARIN-PF4 AB RESULT: <0.6 U/ML — SIGNIFICANT CHANGE UP (ref 0–0.9)
HGB BLD-MCNC: 10 G/DL — LOW (ref 12–16)
HGB BLD-MCNC: 10.5 G/DL — LOW (ref 12–16)
HGB BLD-MCNC: 11.6 G/DL — LOW (ref 12–16)
HGB BLD-MCNC: 13.3 G/DL — SIGNIFICANT CHANGE UP (ref 12–16)
HGB BLD-MCNC: 14.1 G/DL — SIGNIFICANT CHANGE UP (ref 12–16)
HGB BLD-MCNC: 17.3 G/DL — HIGH (ref 12–16)
HGB BLD-MCNC: 7.4 G/DL — LOW (ref 12–16)
HGB BLD-MCNC: 9.4 G/DL — LOW (ref 12–16)
HGB BLD-MCNC: 9.5 G/DL — LOW (ref 12–16)
HGB BLD-MCNC: 9.6 G/DL — LOW (ref 12–16)
HGB BLD-MCNC: 9.7 G/DL — LOW (ref 12–16)
HYALINE CASTS # UR AUTO: 14 /LPF — HIGH (ref 0–7)
IMM GRANULOCYTES NFR BLD AUTO: 0.1 % — SIGNIFICANT CHANGE UP (ref 0.1–0.3)
IMM GRANULOCYTES NFR BLD AUTO: 0.4 % — HIGH (ref 0.1–0.3)
IMM GRANULOCYTES NFR BLD AUTO: 0.5 % — HIGH (ref 0.1–0.3)
IMM GRANULOCYTES NFR BLD AUTO: 0.6 % — HIGH (ref 0.1–0.3)
IMM GRANULOCYTES NFR BLD AUTO: 0.6 % — HIGH (ref 0.1–0.3)
IMM GRANULOCYTES NFR BLD AUTO: 0.7 % — HIGH (ref 0.1–0.3)
IMM GRANULOCYTES NFR BLD AUTO: 0.8 % — HIGH (ref 0.1–0.3)
IMM GRANULOCYTES NFR BLD AUTO: 1.1 % — HIGH (ref 0.1–0.3)
IMM GRANULOCYTES NFR BLD AUTO: 1.5 % — HIGH (ref 0.1–0.3)
INR BLD: 1.13 RATIO — SIGNIFICANT CHANGE UP (ref 0.65–1.3)
INR BLD: 1.18 RATIO — SIGNIFICANT CHANGE UP (ref 0.65–1.3)
INR BLD: 1.3 RATIO — SIGNIFICANT CHANGE UP (ref 0.65–1.3)
INR BLD: 1.87 RATIO — HIGH (ref 0.65–1.3)
INR BLD: 2.1 RATIO — HIGH (ref 0.65–1.3)
INR BLD: 2.32 RATIO — HIGH (ref 0.65–1.3)
IRON SATN MFR SERPL: 14 UG/DL — LOW (ref 35–150)
IRON SATN MFR SERPL: 8 % — LOW (ref 15–50)
KETONES UR-MCNC: NEGATIVE — SIGNIFICANT CHANGE UP
LACTATE SERPL-SCNC: 6 MMOL/L — CRITICAL HIGH (ref 0.7–2)
LEGIONELLA AG UR QL: NEGATIVE — SIGNIFICANT CHANGE UP
LEUKOCYTE ESTERASE UR-ACNC: NEGATIVE — SIGNIFICANT CHANGE UP
LIDOCAIN IGE QN: 227 U/L — HIGH (ref 7–60)
LKM AB SER-ACNC: <20.1 UNITS — SIGNIFICANT CHANGE UP (ref 0–20)
LYMPHOCYTES # BLD AUTO: 0.56 K/UL — LOW (ref 1.2–3.4)
LYMPHOCYTES # BLD AUTO: 0.7 K/UL — LOW (ref 1.2–3.4)
LYMPHOCYTES # BLD AUTO: 0.7 K/UL — LOW (ref 1.2–3.4)
LYMPHOCYTES # BLD AUTO: 0.74 K/UL — LOW (ref 1.2–3.4)
LYMPHOCYTES # BLD AUTO: 0.74 K/UL — LOW (ref 1.2–3.4)
LYMPHOCYTES # BLD AUTO: 0.78 K/UL — LOW (ref 1.2–3.4)
LYMPHOCYTES # BLD AUTO: 0.88 K/UL — LOW (ref 1.2–3.4)
LYMPHOCYTES # BLD AUTO: 1.5 K/UL — SIGNIFICANT CHANGE UP (ref 1.2–3.4)
LYMPHOCYTES # BLD AUTO: 1.92 K/UL — SIGNIFICANT CHANGE UP (ref 1.2–3.4)
LYMPHOCYTES # BLD AUTO: 10 % — LOW (ref 20.5–51.1)
LYMPHOCYTES # BLD AUTO: 10.8 % — LOW (ref 20.5–51.1)
LYMPHOCYTES # BLD AUTO: 11.8 % — LOW (ref 20.5–51.1)
LYMPHOCYTES # BLD AUTO: 14.3 % — LOW (ref 20.5–51.1)
LYMPHOCYTES # BLD AUTO: 3.3 % — LOW (ref 20.5–51.1)
LYMPHOCYTES # BLD AUTO: 5.2 % — LOW (ref 20.5–51.1)
LYMPHOCYTES # BLD AUTO: 7.4 % — LOW (ref 20.5–51.1)
LYMPHOCYTES # BLD AUTO: 7.7 % — LOW (ref 20.5–51.1)
LYMPHOCYTES # BLD AUTO: 8 % — LOW (ref 20.5–51.1)
MAGNESIUM SERPL-MCNC: 1.8 MG/DL — SIGNIFICANT CHANGE UP (ref 1.8–2.4)
MAGNESIUM SERPL-MCNC: 2.3 MG/DL — SIGNIFICANT CHANGE UP (ref 1.8–2.4)
MAGNESIUM SERPL-MCNC: 2.4 MG/DL — SIGNIFICANT CHANGE UP (ref 1.8–2.4)
MAGNESIUM SERPL-MCNC: 2.5 MG/DL — HIGH (ref 1.8–2.4)
MAGNESIUM SERPL-MCNC: 2.6 MG/DL — HIGH (ref 1.8–2.4)
MAGNESIUM SERPL-MCNC: 2.9 MG/DL — HIGH (ref 1.8–2.4)
MCHC RBC-ENTMCNC: 26.2 PG — LOW (ref 27–31)
MCHC RBC-ENTMCNC: 26.2 PG — LOW (ref 27–31)
MCHC RBC-ENTMCNC: 26.4 PG — LOW (ref 27–31)
MCHC RBC-ENTMCNC: 26.9 PG — LOW (ref 27–31)
MCHC RBC-ENTMCNC: 29.2 PG — SIGNIFICANT CHANGE UP (ref 27–31)
MCHC RBC-ENTMCNC: 29.5 PG — SIGNIFICANT CHANGE UP (ref 27–31)
MCHC RBC-ENTMCNC: 29.6 PG — SIGNIFICANT CHANGE UP (ref 27–31)
MCHC RBC-ENTMCNC: 29.6 PG — SIGNIFICANT CHANGE UP (ref 27–31)
MCHC RBC-ENTMCNC: 29.8 G/DL — LOW (ref 32–37)
MCHC RBC-ENTMCNC: 30.1 G/DL — LOW (ref 32–37)
MCHC RBC-ENTMCNC: 30.4 G/DL — LOW (ref 32–37)
MCHC RBC-ENTMCNC: 30.5 PG — SIGNIFICANT CHANGE UP (ref 27–31)
MCHC RBC-ENTMCNC: 30.8 PG — SIGNIFICANT CHANGE UP (ref 27–31)
MCHC RBC-ENTMCNC: 30.9 G/DL — LOW (ref 32–37)
MCHC RBC-ENTMCNC: 30.9 PG — SIGNIFICANT CHANGE UP (ref 27–31)
MCHC RBC-ENTMCNC: 31 G/DL — LOW (ref 32–37)
MCHC RBC-ENTMCNC: 31.1 G/DL — LOW (ref 32–37)
MCHC RBC-ENTMCNC: 31.3 G/DL — LOW (ref 32–37)
MCHC RBC-ENTMCNC: 31.6 G/DL — LOW (ref 32–37)
MCHC RBC-ENTMCNC: 31.8 G/DL — LOW (ref 32–37)
MCHC RBC-ENTMCNC: 32.2 G/DL — SIGNIFICANT CHANGE UP (ref 32–37)
MCHC RBC-ENTMCNC: 32.5 G/DL — SIGNIFICANT CHANGE UP (ref 32–37)
MCV RBC AUTO: 101.8 FL — HIGH (ref 81–99)
MCV RBC AUTO: 82.8 FL — SIGNIFICANT CHANGE UP (ref 81–99)
MCV RBC AUTO: 83.1 FL — SIGNIFICANT CHANGE UP (ref 81–99)
MCV RBC AUTO: 83.1 FL — SIGNIFICANT CHANGE UP (ref 81–99)
MCV RBC AUTO: 84.1 FL — SIGNIFICANT CHANGE UP (ref 81–99)
MCV RBC AUTO: 93.3 FL — SIGNIFICANT CHANGE UP (ref 81–99)
MCV RBC AUTO: 95.6 FL — SIGNIFICANT CHANGE UP (ref 81–99)
MCV RBC AUTO: 95.8 FL — SIGNIFICANT CHANGE UP (ref 81–99)
MCV RBC AUTO: 98.2 FL — SIGNIFICANT CHANGE UP (ref 81–99)
MCV RBC AUTO: 98.5 FL — SIGNIFICANT CHANGE UP (ref 81–99)
MCV RBC AUTO: 99 FL — SIGNIFICANT CHANGE UP (ref 81–99)
MITOCHONDRIA AB SER-ACNC: SIGNIFICANT CHANGE UP
MONOCYTES # BLD AUTO: 0.15 K/UL — SIGNIFICANT CHANGE UP (ref 0.1–0.6)
MONOCYTES # BLD AUTO: 0.23 K/UL — SIGNIFICANT CHANGE UP (ref 0.1–0.6)
MONOCYTES # BLD AUTO: 0.25 K/UL — SIGNIFICANT CHANGE UP (ref 0.1–0.6)
MONOCYTES # BLD AUTO: 0.27 K/UL — SIGNIFICANT CHANGE UP (ref 0.1–0.6)
MONOCYTES # BLD AUTO: 0.27 K/UL — SIGNIFICANT CHANGE UP (ref 0.1–0.6)
MONOCYTES # BLD AUTO: 0.29 K/UL — SIGNIFICANT CHANGE UP (ref 0.1–0.6)
MONOCYTES # BLD AUTO: 0.31 K/UL — SIGNIFICANT CHANGE UP (ref 0.1–0.6)
MONOCYTES # BLD AUTO: 0.39 K/UL — SIGNIFICANT CHANGE UP (ref 0.1–0.6)
MONOCYTES # BLD AUTO: 0.71 K/UL — HIGH (ref 0.1–0.6)
MONOCYTES NFR BLD AUTO: 1.7 % — SIGNIFICANT CHANGE UP (ref 1.7–9.3)
MONOCYTES NFR BLD AUTO: 1.8 % — SIGNIFICANT CHANGE UP (ref 1.7–9.3)
MONOCYTES NFR BLD AUTO: 2.3 % — SIGNIFICANT CHANGE UP (ref 1.7–9.3)
MONOCYTES NFR BLD AUTO: 2.6 % — SIGNIFICANT CHANGE UP (ref 1.7–9.3)
MONOCYTES NFR BLD AUTO: 2.8 % — SIGNIFICANT CHANGE UP (ref 1.7–9.3)
MONOCYTES NFR BLD AUTO: 2.8 % — SIGNIFICANT CHANGE UP (ref 1.7–9.3)
MONOCYTES NFR BLD AUTO: 3.1 % — SIGNIFICANT CHANGE UP (ref 1.7–9.3)
MONOCYTES NFR BLD AUTO: 3.1 % — SIGNIFICANT CHANGE UP (ref 1.7–9.3)
MONOCYTES NFR BLD AUTO: 5.3 % — SIGNIFICANT CHANGE UP (ref 1.7–9.3)
NEUTROPHILS # BLD AUTO: 10.71 K/UL — HIGH (ref 1.4–6.5)
NEUTROPHILS # BLD AUTO: 10.74 K/UL — HIGH (ref 1.4–6.5)
NEUTROPHILS # BLD AUTO: 12.28 K/UL — HIGH (ref 1.4–6.5)
NEUTROPHILS # BLD AUTO: 15.96 K/UL — HIGH (ref 1.4–6.5)
NEUTROPHILS # BLD AUTO: 5.51 K/UL — SIGNIFICANT CHANGE UP (ref 1.4–6.5)
NEUTROPHILS # BLD AUTO: 7.63 K/UL — HIGH (ref 1.4–6.5)
NEUTROPHILS # BLD AUTO: 8.15 K/UL — HIGH (ref 1.4–6.5)
NEUTROPHILS # BLD AUTO: 8.56 K/UL — HIGH (ref 1.4–6.5)
NEUTROPHILS # BLD AUTO: 9.42 K/UL — HIGH (ref 1.4–6.5)
NEUTROPHILS NFR BLD AUTO: 79.9 % — HIGH (ref 42.2–75.2)
NEUTROPHILS NFR BLD AUTO: 84.2 % — HIGH (ref 42.2–75.2)
NEUTROPHILS NFR BLD AUTO: 84.7 % — HIGH (ref 42.2–75.2)
NEUTROPHILS NFR BLD AUTO: 86.3 % — HIGH (ref 42.2–75.2)
NEUTROPHILS NFR BLD AUTO: 87.7 % — HIGH (ref 42.2–75.2)
NEUTROPHILS NFR BLD AUTO: 88.7 % — HIGH (ref 42.2–75.2)
NEUTROPHILS NFR BLD AUTO: 89.2 % — HIGH (ref 42.2–75.2)
NEUTROPHILS NFR BLD AUTO: 91.7 % — HIGH (ref 42.2–75.2)
NEUTROPHILS NFR BLD AUTO: 94.8 % — HIGH (ref 42.2–75.2)
NITRITE UR-MCNC: NEGATIVE — SIGNIFICANT CHANGE UP
NRBC # BLD: 0 /100 WBCS — SIGNIFICANT CHANGE UP (ref 0–0)
NT-PROBNP SERPL-SCNC: 5132 PG/ML — HIGH (ref 0–300)
OSMOLALITY SERPL: 399 MOSMOL/KG — HIGH (ref 280–301)
OSMOLALITY UR: 608 MOS/KG — SIGNIFICANT CHANGE UP (ref 50–1400)
PCO2 BLDA: 33 MMHG — LOW (ref 38–42)
PF4 HEPARIN CMPLX AB SER-ACNC: NEGATIVE — SIGNIFICANT CHANGE UP
PH BLDA: 7.49 — HIGH (ref 7.38–7.42)
PH UR: 6 — SIGNIFICANT CHANGE UP (ref 5–8)
PHOSPHATE SERPL-MCNC: 2.4 MG/DL — SIGNIFICANT CHANGE UP (ref 2.1–4.9)
PHOSPHATE SERPL-MCNC: 2.8 MG/DL — SIGNIFICANT CHANGE UP (ref 2.1–4.9)
PHOSPHATE SERPL-MCNC: 2.9 MG/DL — SIGNIFICANT CHANGE UP (ref 2.1–4.9)
PHOSPHATE SERPL-MCNC: 3 MG/DL — SIGNIFICANT CHANGE UP (ref 2.1–4.9)
PLATELET # BLD AUTO: 109 K/UL — LOW (ref 130–400)
PLATELET # BLD AUTO: 128 K/UL — LOW (ref 130–400)
PLATELET # BLD AUTO: 143 K/UL — SIGNIFICANT CHANGE UP (ref 130–400)
PLATELET # BLD AUTO: 167 K/UL — SIGNIFICANT CHANGE UP (ref 130–400)
PLATELET # BLD AUTO: 189 K/UL — SIGNIFICANT CHANGE UP (ref 130–400)
PLATELET # BLD AUTO: 81 K/UL — LOW (ref 130–400)
PLATELET # BLD AUTO: 85 K/UL — LOW (ref 130–400)
PLATELET # BLD AUTO: 87 K/UL — LOW (ref 130–400)
PLATELET # BLD AUTO: 89 K/UL — LOW (ref 130–400)
PLATELET # BLD AUTO: 97 K/UL — LOW (ref 130–400)
PLATELET # BLD AUTO: 97 K/UL — LOW (ref 130–400)
PO2 BLDA: 92 MMHG — SIGNIFICANT CHANGE UP (ref 78–95)
POTASSIUM SERPL-MCNC: 2.6 MMOL/L — CRITICAL LOW (ref 3.5–5)
POTASSIUM SERPL-MCNC: 2.6 MMOL/L — CRITICAL LOW (ref 3.5–5)
POTASSIUM SERPL-MCNC: 3 MMOL/L — LOW (ref 3.5–5)
POTASSIUM SERPL-MCNC: 3.1 MMOL/L — LOW (ref 3.5–5)
POTASSIUM SERPL-MCNC: 3.4 MMOL/L — LOW (ref 3.5–5)
POTASSIUM SERPL-MCNC: 3.6 MMOL/L — SIGNIFICANT CHANGE UP (ref 3.5–5)
POTASSIUM SERPL-MCNC: 3.6 MMOL/L — SIGNIFICANT CHANGE UP (ref 3.5–5)
POTASSIUM SERPL-MCNC: 3.8 MMOL/L — SIGNIFICANT CHANGE UP (ref 3.5–5)
POTASSIUM SERPL-MCNC: 3.9 MMOL/L — SIGNIFICANT CHANGE UP (ref 3.5–5)
POTASSIUM SERPL-MCNC: 4.1 MMOL/L — SIGNIFICANT CHANGE UP (ref 3.5–5)
POTASSIUM SERPL-MCNC: 4.3 MMOL/L — SIGNIFICANT CHANGE UP (ref 3.5–5)
POTASSIUM SERPL-MCNC: 4.4 MMOL/L — SIGNIFICANT CHANGE UP (ref 3.5–5)
POTASSIUM SERPL-MCNC: 4.6 MMOL/L — SIGNIFICANT CHANGE UP (ref 3.5–5)
POTASSIUM SERPL-MCNC: 4.6 MMOL/L — SIGNIFICANT CHANGE UP (ref 3.5–5)
POTASSIUM SERPL-MCNC: 5 MMOL/L — SIGNIFICANT CHANGE UP (ref 3.5–5)
POTASSIUM SERPL-SCNC: 2.6 MMOL/L — CRITICAL LOW (ref 3.5–5)
POTASSIUM SERPL-SCNC: 2.6 MMOL/L — CRITICAL LOW (ref 3.5–5)
POTASSIUM SERPL-SCNC: 3 MMOL/L — LOW (ref 3.5–5)
POTASSIUM SERPL-SCNC: 3.1 MMOL/L — LOW (ref 3.5–5)
POTASSIUM SERPL-SCNC: 3.4 MMOL/L — LOW (ref 3.5–5)
POTASSIUM SERPL-SCNC: 3.6 MMOL/L — SIGNIFICANT CHANGE UP (ref 3.5–5)
POTASSIUM SERPL-SCNC: 3.6 MMOL/L — SIGNIFICANT CHANGE UP (ref 3.5–5)
POTASSIUM SERPL-SCNC: 3.8 MMOL/L — SIGNIFICANT CHANGE UP (ref 3.5–5)
POTASSIUM SERPL-SCNC: 3.9 MMOL/L — SIGNIFICANT CHANGE UP (ref 3.5–5)
POTASSIUM SERPL-SCNC: 4.1 MMOL/L — SIGNIFICANT CHANGE UP (ref 3.5–5)
POTASSIUM SERPL-SCNC: 4.3 MMOL/L — SIGNIFICANT CHANGE UP (ref 3.5–5)
POTASSIUM SERPL-SCNC: 4.4 MMOL/L — SIGNIFICANT CHANGE UP (ref 3.5–5)
POTASSIUM SERPL-SCNC: 4.6 MMOL/L — SIGNIFICANT CHANGE UP (ref 3.5–5)
POTASSIUM SERPL-SCNC: 4.6 MMOL/L — SIGNIFICANT CHANGE UP (ref 3.5–5)
POTASSIUM SERPL-SCNC: 5 MMOL/L — SIGNIFICANT CHANGE UP (ref 3.5–5)
PROCALCITONIN SERPL-MCNC: 0.23 NG/ML — HIGH (ref 0.02–0.1)
PROT SERPL-MCNC: 4.1 G/DL — LOW (ref 6–8)
PROT SERPL-MCNC: 4.2 G/DL — LOW (ref 6–8)
PROT SERPL-MCNC: 4.7 G/DL — LOW (ref 6–8)
PROT SERPL-MCNC: 4.8 G/DL — LOW (ref 6–8)
PROT SERPL-MCNC: 4.9 G/DL — LOW (ref 6–8)
PROT SERPL-MCNC: 5.1 G/DL — LOW (ref 6–8)
PROT SERPL-MCNC: 5.1 G/DL — LOW (ref 6–8)
PROT SERPL-MCNC: 5.6 G/DL — LOW (ref 6–8)
PROT SERPL-MCNC: 5.8 G/DL — LOW (ref 6–8)
PROT SERPL-MCNC: 5.9 G/DL — LOW (ref 6–8)
PROT SERPL-MCNC: 7.9 G/DL — SIGNIFICANT CHANGE UP (ref 6–8)
PROT UR-MCNC: ABNORMAL
PROTHROM AB SERPL-ACNC: 13 SEC — HIGH (ref 9.95–12.87)
PROTHROM AB SERPL-ACNC: 13.6 SEC — HIGH (ref 9.95–12.87)
PROTHROM AB SERPL-ACNC: 14.9 SEC — HIGH (ref 9.95–12.87)
PROTHROM AB SERPL-ACNC: 21.5 SEC — HIGH (ref 9.95–12.87)
PROTHROM AB SERPL-ACNC: 24.1 SEC — HIGH (ref 9.95–12.87)
PROTHROM AB SERPL-ACNC: 26.7 SEC — HIGH (ref 9.95–12.87)
RBC # BLD: 2.5 M/UL — LOW (ref 4.2–5.4)
RBC # BLD: 3.08 M/UL — LOW (ref 4.2–5.4)
RBC # BLD: 3.59 M/UL — LOW (ref 4.2–5.4)
RBC # BLD: 3.6 M/UL — LOW (ref 4.2–5.4)
RBC # BLD: 3.66 M/UL — LOW (ref 4.2–5.4)
RBC # BLD: 3.67 M/UL — LOW (ref 4.2–5.4)
RBC # BLD: 3.72 M/UL — LOW (ref 4.2–5.4)
RBC # BLD: 3.93 M/UL — LOW (ref 4.2–5.4)
RBC # BLD: 4.5 M/UL — SIGNIFICANT CHANGE UP (ref 4.2–5.4)
RBC # BLD: 4.63 M/UL — SIGNIFICANT CHANGE UP (ref 4.2–5.4)
RBC # BLD: 5.6 M/UL — HIGH (ref 4.2–5.4)
RBC # FLD: 15.8 % — HIGH (ref 11.5–14.5)
RBC # FLD: 15.9 % — HIGH (ref 11.5–14.5)
RBC # FLD: 16 % — HIGH (ref 11.5–14.5)
RBC # FLD: 16 % — HIGH (ref 11.5–14.5)
RBC # FLD: 16.4 % — HIGH (ref 11.5–14.5)
RBC # FLD: 16.6 % — HIGH (ref 11.5–14.5)
RBC # FLD: 17.8 % — HIGH (ref 11.5–14.5)
RBC # FLD: 22.6 % — HIGH (ref 11.5–14.5)
RBC # FLD: 24.8 % — HIGH (ref 11.5–14.5)
RBC # FLD: 25.1 % — HIGH (ref 11.5–14.5)
RBC # FLD: 25.1 % — HIGH (ref 11.5–14.5)
RBC CASTS # UR COMP ASSIST: 2 /HPF — SIGNIFICANT CHANGE UP (ref 0–4)
S PNEUM AG UR QL: NEGATIVE — SIGNIFICANT CHANGE UP
SALICYLATES SERPL-MCNC: <0.3 MG/DL — LOW (ref 4–30)
SAO2 % BLDA: 98 % — SIGNIFICANT CHANGE UP (ref 94–98)
SARS-COV-2 RNA SPEC QL NAA+PROBE: SIGNIFICANT CHANGE UP
SMOOTH MUSCLE AB SER-ACNC: ABNORMAL
SODIUM SERPL-SCNC: 147 MMOL/L — HIGH (ref 135–146)
SODIUM SERPL-SCNC: 152 MMOL/L — HIGH (ref 135–146)
SODIUM SERPL-SCNC: 152 MMOL/L — HIGH (ref 135–146)
SODIUM SERPL-SCNC: 153 MMOL/L — HIGH (ref 135–146)
SODIUM SERPL-SCNC: 158 MMOL/L — HIGH (ref 135–146)
SODIUM SERPL-SCNC: 158 MMOL/L — HIGH (ref 135–146)
SODIUM SERPL-SCNC: 159 MMOL/L — HIGH (ref 135–146)
SODIUM SERPL-SCNC: 159 MMOL/L — HIGH (ref 135–146)
SODIUM SERPL-SCNC: 165 MMOL/L — CRITICAL HIGH (ref 135–146)
SODIUM SERPL-SCNC: 169 MMOL/L — CRITICAL HIGH (ref 135–146)
SODIUM SERPL-SCNC: 171 MMOL/L — CRITICAL HIGH (ref 135–146)
SODIUM SERPL-SCNC: 174 MMOL/L — CRITICAL HIGH (ref 135–146)
SODIUM UR-SCNC: 25 MMOL/L — SIGNIFICANT CHANGE UP
SP GR SPEC: 1.02 — SIGNIFICANT CHANGE UP (ref 1.01–1.02)
SPECIMEN SOURCE: SIGNIFICANT CHANGE UP
TIBC SERPL-MCNC: 165 UG/DL — LOW (ref 220–430)
TROPONIN T SERPL-MCNC: 0.08 NG/ML — CRITICAL HIGH
TROPONIN T SERPL-MCNC: 0.14 NG/ML — CRITICAL HIGH
UIBC SERPL-MCNC: 151 UG/DL — SIGNIFICANT CHANGE UP (ref 110–370)
UROBILINOGEN FLD QL: SIGNIFICANT CHANGE UP
VIT B12 SERPL-MCNC: >2000 PG/ML — HIGH (ref 232–1245)
WBC # BLD: 10.55 K/UL — SIGNIFICANT CHANGE UP (ref 4.8–10.8)
WBC # BLD: 12.75 K/UL — HIGH (ref 4.8–10.8)
WBC # BLD: 13.4 K/UL — HIGH (ref 4.8–10.8)
WBC # BLD: 13.41 K/UL — HIGH (ref 4.8–10.8)
WBC # BLD: 13.86 K/UL — HIGH (ref 4.8–10.8)
WBC # BLD: 16.85 K/UL — HIGH (ref 4.8–10.8)
WBC # BLD: 6.5 K/UL — SIGNIFICANT CHANGE UP (ref 4.8–10.8)
WBC # BLD: 8.84 K/UL — SIGNIFICANT CHANGE UP (ref 4.8–10.8)
WBC # BLD: 9.29 K/UL — SIGNIFICANT CHANGE UP (ref 4.8–10.8)
WBC # BLD: 9.65 K/UL — SIGNIFICANT CHANGE UP (ref 4.8–10.8)
WBC # BLD: 9.71 K/UL — SIGNIFICANT CHANGE UP (ref 4.8–10.8)
WBC # FLD AUTO: 10.55 K/UL — SIGNIFICANT CHANGE UP (ref 4.8–10.8)
WBC # FLD AUTO: 12.75 K/UL — HIGH (ref 4.8–10.8)
WBC # FLD AUTO: 13.4 K/UL — HIGH (ref 4.8–10.8)
WBC # FLD AUTO: 13.41 K/UL — HIGH (ref 4.8–10.8)
WBC # FLD AUTO: 13.86 K/UL — HIGH (ref 4.8–10.8)
WBC # FLD AUTO: 16.85 K/UL — HIGH (ref 4.8–10.8)
WBC # FLD AUTO: 6.5 K/UL — SIGNIFICANT CHANGE UP (ref 4.8–10.8)
WBC # FLD AUTO: 8.84 K/UL — SIGNIFICANT CHANGE UP (ref 4.8–10.8)
WBC # FLD AUTO: 9.29 K/UL — SIGNIFICANT CHANGE UP (ref 4.8–10.8)
WBC # FLD AUTO: 9.65 K/UL — SIGNIFICANT CHANGE UP (ref 4.8–10.8)
WBC # FLD AUTO: 9.71 K/UL — SIGNIFICANT CHANGE UP (ref 4.8–10.8)
WBC UR QL: 3 /HPF — SIGNIFICANT CHANGE UP (ref 0–5)

## 2020-01-01 PROCEDURE — 99233 SBSQ HOSP IP/OBS HIGH 50: CPT

## 2020-01-01 PROCEDURE — 99222 1ST HOSP IP/OBS MODERATE 55: CPT

## 2020-01-01 PROCEDURE — 99223 1ST HOSP IP/OBS HIGH 75: CPT

## 2020-01-01 PROCEDURE — 99239 HOSP IP/OBS DSCHRG MGMT >30: CPT

## 2020-01-01 PROCEDURE — 71045 X-RAY EXAM CHEST 1 VIEW: CPT | Mod: 26

## 2020-01-01 PROCEDURE — 86077 PHYS BLOOD BANK SERV XMATCH: CPT

## 2020-01-01 PROCEDURE — 93010 ELECTROCARDIOGRAM REPORT: CPT

## 2020-01-01 PROCEDURE — 99497 ADVNCD CARE PLAN 30 MIN: CPT | Mod: 25

## 2020-01-01 PROCEDURE — 99251: CPT

## 2020-01-01 PROCEDURE — 99221 1ST HOSP IP/OBS SF/LOW 40: CPT

## 2020-01-01 PROCEDURE — 70450 CT HEAD/BRAIN W/O DYE: CPT | Mod: 26

## 2020-01-01 PROCEDURE — 99285 EMERGENCY DEPT VISIT HI MDM: CPT

## 2020-01-01 PROCEDURE — 74177 CT ABD & PELVIS W/CONTRAST: CPT | Mod: 26

## 2020-01-01 RX ORDER — SODIUM CHLORIDE 9 MG/ML
1000 INJECTION, SOLUTION INTRAVENOUS
Refills: 0 | Status: DISCONTINUED | OUTPATIENT
Start: 2020-01-01 | End: 2020-01-01

## 2020-01-01 RX ORDER — MINERAL OIL
133 OIL (ML) MISCELLANEOUS ONCE
Refills: 0 | Status: DISCONTINUED | OUTPATIENT
Start: 2020-01-01 | End: 2020-01-01

## 2020-01-01 RX ORDER — POTASSIUM CHLORIDE 20 MEQ
20 PACKET (EA) ORAL EVERY 4 HOURS
Refills: 0 | Status: COMPLETED | OUTPATIENT
Start: 2020-01-01 | End: 2020-01-01

## 2020-01-01 RX ORDER — POTASSIUM CHLORIDE 20 MEQ
20 PACKET (EA) ORAL
Refills: 0 | Status: COMPLETED | OUTPATIENT
Start: 2020-01-01 | End: 2020-01-01

## 2020-01-01 RX ORDER — CHLORHEXIDINE GLUCONATE 213 G/1000ML
1 SOLUTION TOPICAL
Refills: 0 | Status: DISCONTINUED | OUTPATIENT
Start: 2020-01-01 | End: 2020-01-01

## 2020-01-01 RX ORDER — ATENOLOL 25 MG/1
50 TABLET ORAL DAILY
Refills: 0 | Status: DISCONTINUED | OUTPATIENT
Start: 2020-01-01 | End: 2020-01-01

## 2020-01-01 RX ORDER — GLYCERIN ADULT
1 SUPPOSITORY, RECTAL RECTAL AT BEDTIME
Refills: 0 | Status: DISCONTINUED | OUTPATIENT
Start: 2020-01-01 | End: 2020-01-01

## 2020-01-01 RX ORDER — POTASSIUM CHLORIDE 20 MEQ
20 PACKET (EA) ORAL ONCE
Refills: 0 | Status: COMPLETED | OUTPATIENT
Start: 2020-01-01 | End: 2020-01-01

## 2020-01-01 RX ORDER — CEFEPIME 1 G/1
1000 INJECTION, POWDER, FOR SOLUTION INTRAMUSCULAR; INTRAVENOUS ONCE
Refills: 0 | Status: COMPLETED | OUTPATIENT
Start: 2020-01-01 | End: 2020-01-01

## 2020-01-01 RX ORDER — IOHEXOL 300 MG/ML
30 INJECTION, SOLUTION INTRAVENOUS ONCE
Refills: 0 | Status: DISCONTINUED | OUTPATIENT
Start: 2020-01-01 | End: 2020-01-01

## 2020-01-01 RX ORDER — NOREPINEPHRINE BITARTRATE/D5W 8 MG/250ML
0.02 PLASTIC BAG, INJECTION (ML) INTRAVENOUS
Qty: 8 | Refills: 0 | Status: DISCONTINUED | OUTPATIENT
Start: 2020-01-01 | End: 2020-01-01

## 2020-01-01 RX ORDER — ATORVASTATIN CALCIUM 80 MG/1
1 TABLET, FILM COATED ORAL
Qty: 0 | Refills: 0 | DISCHARGE

## 2020-01-01 RX ORDER — FUROSEMIDE 40 MG
20 TABLET ORAL ONCE
Refills: 0 | Status: COMPLETED | OUTPATIENT
Start: 2020-01-01 | End: 2020-01-01

## 2020-01-01 RX ORDER — MORPHINE SULFATE 50 MG/1
2 CAPSULE, EXTENDED RELEASE ORAL
Refills: 0 | Status: DISCONTINUED | OUTPATIENT
Start: 2020-01-01 | End: 2020-01-01

## 2020-01-01 RX ORDER — THIAMINE MONONITRATE (VIT B1) 100 MG
100 TABLET ORAL DAILY
Refills: 0 | Status: DISCONTINUED | OUTPATIENT
Start: 2020-01-01 | End: 2020-01-01

## 2020-01-01 RX ORDER — CEFEPIME 1 G/1
INJECTION, POWDER, FOR SOLUTION INTRAMUSCULAR; INTRAVENOUS
Refills: 0 | Status: DISCONTINUED | OUTPATIENT
Start: 2020-01-01 | End: 2020-01-01

## 2020-01-01 RX ORDER — CEFEPIME 1 G/1
1000 INJECTION, POWDER, FOR SOLUTION INTRAMUSCULAR; INTRAVENOUS EVERY 24 HOURS
Refills: 0 | Status: DISCONTINUED | OUTPATIENT
Start: 2020-01-01 | End: 2020-01-01

## 2020-01-01 RX ORDER — PANTOPRAZOLE SODIUM 20 MG/1
40 TABLET, DELAYED RELEASE ORAL
Refills: 0 | Status: DISCONTINUED | OUTPATIENT
Start: 2020-01-01 | End: 2020-01-01

## 2020-01-01 RX ORDER — SODIUM CHLORIDE 9 MG/ML
2000 INJECTION, SOLUTION INTRAVENOUS ONCE
Refills: 0 | Status: COMPLETED | OUTPATIENT
Start: 2020-01-01 | End: 2020-01-01

## 2020-01-01 RX ORDER — CEFEPIME 1 G/1
1000 INJECTION, POWDER, FOR SOLUTION INTRAMUSCULAR; INTRAVENOUS EVERY 12 HOURS
Refills: 0 | Status: DISCONTINUED | OUTPATIENT
Start: 2020-01-01 | End: 2020-01-01

## 2020-01-01 RX ORDER — MAGNESIUM SULFATE 500 MG/ML
1 VIAL (ML) INJECTION ONCE
Refills: 0 | Status: COMPLETED | OUTPATIENT
Start: 2020-01-01 | End: 2020-01-01

## 2020-01-01 RX ORDER — METFORMIN HYDROCHLORIDE 850 MG/1
1 TABLET ORAL
Qty: 0 | Refills: 0 | DISCHARGE

## 2020-01-01 RX ORDER — POTASSIUM CHLORIDE 20 MEQ
40 PACKET (EA) ORAL EVERY 4 HOURS
Refills: 0 | Status: DISCONTINUED | OUTPATIENT
Start: 2020-01-01 | End: 2020-01-01

## 2020-01-01 RX ORDER — PHYTONADIONE (VIT K1) 5 MG
5 TABLET ORAL ONCE
Refills: 0 | Status: COMPLETED | OUTPATIENT
Start: 2020-01-01 | End: 2020-01-01

## 2020-01-01 RX ORDER — CEFEPIME 1 G/1
2000 INJECTION, POWDER, FOR SOLUTION INTRAMUSCULAR; INTRAVENOUS ONCE
Refills: 0 | Status: COMPLETED | OUTPATIENT
Start: 2020-01-01 | End: 2020-01-01

## 2020-01-01 RX ORDER — POLYETHYLENE GLYCOL 3350 17 G/17G
17 POWDER, FOR SOLUTION ORAL
Refills: 0 | Status: DISCONTINUED | OUTPATIENT
Start: 2020-01-01 | End: 2020-01-01

## 2020-01-01 RX ORDER — METRONIDAZOLE 500 MG
500 TABLET ORAL ONCE
Refills: 0 | Status: COMPLETED | OUTPATIENT
Start: 2020-01-01 | End: 2020-01-01

## 2020-01-01 RX ORDER — ONDANSETRON 8 MG/1
4 TABLET, FILM COATED ORAL ONCE
Refills: 0 | Status: COMPLETED | OUTPATIENT
Start: 2020-01-01 | End: 2020-01-01

## 2020-01-01 RX ORDER — HEPARIN SODIUM 5000 [USP'U]/ML
5000 INJECTION INTRAVENOUS; SUBCUTANEOUS EVERY 12 HOURS
Refills: 0 | Status: DISCONTINUED | OUTPATIENT
Start: 2020-01-01 | End: 2020-01-01

## 2020-01-01 RX ORDER — METRONIDAZOLE 500 MG
500 TABLET ORAL EVERY 8 HOURS
Refills: 0 | Status: DISCONTINUED | OUTPATIENT
Start: 2020-01-01 | End: 2020-01-01

## 2020-01-01 RX ORDER — ACETAMINOPHEN 500 MG
650 TABLET ORAL EVERY 6 HOURS
Refills: 0 | Status: DISCONTINUED | OUTPATIENT
Start: 2020-01-01 | End: 2020-01-01

## 2020-01-01 RX ORDER — METRONIDAZOLE 500 MG
TABLET ORAL
Refills: 0 | Status: DISCONTINUED | OUTPATIENT
Start: 2020-01-01 | End: 2020-01-01

## 2020-01-01 RX ORDER — ATORVASTATIN CALCIUM 80 MG/1
20 TABLET, FILM COATED ORAL AT BEDTIME
Refills: 0 | Status: DISCONTINUED | OUTPATIENT
Start: 2020-01-01 | End: 2020-01-01

## 2020-01-01 RX ORDER — ATENOLOL 25 MG/1
1 TABLET ORAL
Qty: 0 | Refills: 0 | DISCHARGE

## 2020-01-01 RX ORDER — POTASSIUM CHLORIDE 20 MEQ
40 PACKET (EA) ORAL ONCE
Refills: 0 | Status: COMPLETED | OUTPATIENT
Start: 2020-01-01 | End: 2020-01-01

## 2020-01-01 RX ORDER — SODIUM CHLORIDE 9 MG/ML
1000 INJECTION INTRAMUSCULAR; INTRAVENOUS; SUBCUTANEOUS
Refills: 0 | Status: DISCONTINUED | OUTPATIENT
Start: 2020-01-01 | End: 2020-01-01

## 2020-01-01 RX ORDER — POTASSIUM CHLORIDE 20 MEQ
40 PACKET (EA) ORAL ONCE
Refills: 0 | Status: DISCONTINUED | OUTPATIENT
Start: 2020-01-01 | End: 2020-01-01

## 2020-01-01 RX ADMIN — CEFEPIME 100 MILLIGRAM(S): 1 INJECTION, POWDER, FOR SOLUTION INTRAMUSCULAR; INTRAVENOUS at 18:11

## 2020-01-01 RX ADMIN — CHLORHEXIDINE GLUCONATE 1 APPLICATION(S): 213 SOLUTION TOPICAL at 05:55

## 2020-01-01 RX ADMIN — Medication 50 MILLIEQUIVALENT(S): at 13:29

## 2020-01-01 RX ADMIN — CEFEPIME 100 MILLIGRAM(S): 1 INJECTION, POWDER, FOR SOLUTION INTRAMUSCULAR; INTRAVENOUS at 05:33

## 2020-01-01 RX ADMIN — HEPARIN SODIUM 5000 UNIT(S): 5000 INJECTION INTRAVENOUS; SUBCUTANEOUS at 05:44

## 2020-01-01 RX ADMIN — Medication 50 MILLIEQUIVALENT(S): at 18:40

## 2020-01-01 RX ADMIN — Medication 50 MILLIEQUIVALENT(S): at 12:07

## 2020-01-01 RX ADMIN — PANTOPRAZOLE SODIUM 40 MILLIGRAM(S): 20 TABLET, DELAYED RELEASE ORAL at 05:56

## 2020-01-01 RX ADMIN — Medication 50 MILLIEQUIVALENT(S): at 11:31

## 2020-01-01 RX ADMIN — CHLORHEXIDINE GLUCONATE 1 APPLICATION(S): 213 SOLUTION TOPICAL at 05:28

## 2020-01-01 RX ADMIN — Medication 100 GRAM(S): at 08:05

## 2020-01-01 RX ADMIN — Medication 1 TABLET(S): at 12:17

## 2020-01-01 RX ADMIN — SODIUM CHLORIDE 50 MILLILITER(S): 9 INJECTION, SOLUTION INTRAVENOUS at 09:27

## 2020-01-01 RX ADMIN — Medication 100 MILLIGRAM(S): at 05:03

## 2020-01-01 RX ADMIN — Medication 100 MILLIGRAM(S): at 05:46

## 2020-01-01 RX ADMIN — CHLORHEXIDINE GLUCONATE 1 APPLICATION(S): 213 SOLUTION TOPICAL at 05:44

## 2020-01-01 RX ADMIN — Medication 100 MILLIGRAM(S): at 14:44

## 2020-01-01 RX ADMIN — Medication 20 MILLIGRAM(S): at 15:20

## 2020-01-01 RX ADMIN — Medication 100 MILLIGRAM(S): at 05:33

## 2020-01-01 RX ADMIN — Medication 100 MILLIGRAM(S): at 21:31

## 2020-01-01 RX ADMIN — CEFEPIME 100 MILLIGRAM(S): 1 INJECTION, POWDER, FOR SOLUTION INTRAMUSCULAR; INTRAVENOUS at 17:07

## 2020-01-01 RX ADMIN — CHLORHEXIDINE GLUCONATE 1 APPLICATION(S): 213 SOLUTION TOPICAL at 05:53

## 2020-01-01 RX ADMIN — Medication 40 MILLIEQUIVALENT(S): at 13:02

## 2020-01-01 RX ADMIN — CHLORHEXIDINE GLUCONATE 1 APPLICATION(S): 213 SOLUTION TOPICAL at 05:33

## 2020-01-01 RX ADMIN — Medication 100 MILLIGRAM(S): at 20:55

## 2020-01-01 RX ADMIN — PANTOPRAZOLE SODIUM 40 MILLIGRAM(S): 20 TABLET, DELAYED RELEASE ORAL at 17:40

## 2020-01-01 RX ADMIN — PANTOPRAZOLE SODIUM 40 MILLIGRAM(S): 20 TABLET, DELAYED RELEASE ORAL at 05:45

## 2020-01-01 RX ADMIN — Medication 100 MILLIGRAM(S): at 05:04

## 2020-01-01 RX ADMIN — Medication 50 MILLIEQUIVALENT(S): at 15:27

## 2020-01-01 RX ADMIN — HEPARIN SODIUM 5000 UNIT(S): 5000 INJECTION INTRAVENOUS; SUBCUTANEOUS at 05:06

## 2020-01-01 RX ADMIN — CEFEPIME 100 MILLIGRAM(S): 1 INJECTION, POWDER, FOR SOLUTION INTRAMUSCULAR; INTRAVENOUS at 04:33

## 2020-01-01 RX ADMIN — PANTOPRAZOLE SODIUM 40 MILLIGRAM(S): 20 TABLET, DELAYED RELEASE ORAL at 17:07

## 2020-01-01 RX ADMIN — SODIUM CHLORIDE 250 MILLILITER(S): 9 INJECTION, SOLUTION INTRAVENOUS at 06:45

## 2020-01-01 RX ADMIN — PANTOPRAZOLE SODIUM 40 MILLIGRAM(S): 20 TABLET, DELAYED RELEASE ORAL at 05:32

## 2020-01-01 RX ADMIN — SODIUM CHLORIDE 50 MILLILITER(S): 9 INJECTION, SOLUTION INTRAVENOUS at 19:44

## 2020-01-01 RX ADMIN — PANTOPRAZOLE SODIUM 40 MILLIGRAM(S): 20 TABLET, DELAYED RELEASE ORAL at 18:07

## 2020-01-01 RX ADMIN — Medication 1 TABLET(S): at 13:01

## 2020-01-01 RX ADMIN — HEPARIN SODIUM 5000 UNIT(S): 5000 INJECTION INTRAVENOUS; SUBCUTANEOUS at 18:07

## 2020-01-01 RX ADMIN — PANTOPRAZOLE SODIUM 40 MILLIGRAM(S): 20 TABLET, DELAYED RELEASE ORAL at 17:57

## 2020-01-01 RX ADMIN — Medication 50 MILLIEQUIVALENT(S): at 15:33

## 2020-01-01 RX ADMIN — Medication 100 MILLIGRAM(S): at 05:35

## 2020-01-01 RX ADMIN — Medication 50 MILLIEQUIVALENT(S): at 05:48

## 2020-01-01 RX ADMIN — HEPARIN SODIUM 5000 UNIT(S): 5000 INJECTION INTRAVENOUS; SUBCUTANEOUS at 05:34

## 2020-01-01 RX ADMIN — Medication 100 MILLIGRAM(S): at 00:43

## 2020-01-01 RX ADMIN — CEFEPIME 100 MILLIGRAM(S): 1 INJECTION, POWDER, FOR SOLUTION INTRAMUSCULAR; INTRAVENOUS at 17:41

## 2020-01-01 RX ADMIN — ONDANSETRON 4 MILLIGRAM(S): 8 TABLET, FILM COATED ORAL at 17:33

## 2020-01-01 RX ADMIN — PANTOPRAZOLE SODIUM 40 MILLIGRAM(S): 20 TABLET, DELAYED RELEASE ORAL at 21:07

## 2020-01-01 RX ADMIN — SODIUM CHLORIDE 75 MILLILITER(S): 9 INJECTION, SOLUTION INTRAVENOUS at 08:22

## 2020-01-01 RX ADMIN — Medication 101 MILLIGRAM(S): at 18:21

## 2020-01-01 RX ADMIN — SODIUM CHLORIDE 75 MILLILITER(S): 9 INJECTION, SOLUTION INTRAVENOUS at 23:34

## 2020-01-01 RX ADMIN — Medication 100 MILLIGRAM(S): at 21:18

## 2020-01-01 RX ADMIN — Medication 40 MILLIEQUIVALENT(S): at 12:17

## 2020-01-01 RX ADMIN — CEFEPIME 100 MILLIGRAM(S): 1 INJECTION, POWDER, FOR SOLUTION INTRAMUSCULAR; INTRAVENOUS at 18:07

## 2020-01-01 RX ADMIN — Medication 100 MILLIGRAM(S): at 05:56

## 2020-01-01 RX ADMIN — SODIUM CHLORIDE 500 MILLILITER(S): 9 INJECTION INTRAMUSCULAR; INTRAVENOUS; SUBCUTANEOUS at 01:54

## 2020-01-01 RX ADMIN — SODIUM CHLORIDE 2000 MILLILITER(S): 9 INJECTION, SOLUTION INTRAVENOUS at 15:26

## 2020-01-01 RX ADMIN — Medication 100 MILLIGRAM(S): at 21:32

## 2020-01-01 RX ADMIN — Medication 50 MILLIEQUIVALENT(S): at 12:48

## 2020-01-01 RX ADMIN — Medication 100 MILLIGRAM(S): at 17:40

## 2020-01-01 RX ADMIN — CEFEPIME 100 MILLIGRAM(S): 1 INJECTION, POWDER, FOR SOLUTION INTRAMUSCULAR; INTRAVENOUS at 20:54

## 2020-01-01 RX ADMIN — PANTOPRAZOLE SODIUM 40 MILLIGRAM(S): 20 TABLET, DELAYED RELEASE ORAL at 05:03

## 2020-01-01 RX ADMIN — Medication 100 MILLIGRAM(S): at 22:04

## 2020-01-01 RX ADMIN — PANTOPRAZOLE SODIUM 40 MILLIGRAM(S): 20 TABLET, DELAYED RELEASE ORAL at 05:35

## 2020-01-01 RX ADMIN — Medication 100 MILLIGRAM(S): at 14:32

## 2020-01-01 RX ADMIN — CEFEPIME 100 MILLIGRAM(S): 1 INJECTION, POWDER, FOR SOLUTION INTRAMUSCULAR; INTRAVENOUS at 05:46

## 2020-01-01 RX ADMIN — SODIUM CHLORIDE 50 MILLILITER(S): 9 INJECTION, SOLUTION INTRAVENOUS at 08:37

## 2020-01-01 RX ADMIN — Medication 100 MILLIGRAM(S): at 13:18

## 2020-01-01 RX ADMIN — CEFEPIME 100 MILLIGRAM(S): 1 INJECTION, POWDER, FOR SOLUTION INTRAMUSCULAR; INTRAVENOUS at 19:06

## 2020-01-01 RX ADMIN — Medication 40 MILLIEQUIVALENT(S): at 14:46

## 2020-01-01 RX ADMIN — Medication 1.65 MICROGRAM(S)/KG/MIN: at 06:56

## 2020-01-01 RX ADMIN — CHLORHEXIDINE GLUCONATE 1 APPLICATION(S): 213 SOLUTION TOPICAL at 05:07

## 2020-01-01 RX ADMIN — Medication 1.65 MICROGRAM(S)/KG/MIN: at 13:18

## 2020-01-01 RX ADMIN — HEPARIN SODIUM 5000 UNIT(S): 5000 INJECTION INTRAVENOUS; SUBCUTANEOUS at 18:23

## 2020-01-01 RX ADMIN — CHLORHEXIDINE GLUCONATE 1 APPLICATION(S): 213 SOLUTION TOPICAL at 05:03

## 2020-01-01 RX ADMIN — Medication 50 MILLIEQUIVALENT(S): at 14:16

## 2020-01-01 RX ADMIN — CEFEPIME 100 MILLIGRAM(S): 1 INJECTION, POWDER, FOR SOLUTION INTRAMUSCULAR; INTRAVENOUS at 21:13

## 2020-04-25 NOTE — H&P ADULT - ATTENDING COMMENTS
I saw and evaluated the patient. I have reviewed and agree with the findings and plan of care as documented above in the resident’s note (unless indicated differently below). Any necessary changes were made in the body of the text.    89 yo F pt w/ DM, CKD 3 and cognitive impairment (pt is nonverbal at baseline). P/w generalized weakness x 1 week associated w/ decreased PO intake and an episode of vomiting today. Has been bedbound (was able to ambulate in the past with assistance). No fever or changes in bowel habit. No recent sick contacts. Family sent patient in because of the aforementioned. The patient herself is unable to provide any history or express her concerns or complaints.    Home meds: metformin 500 mg bid, atenolol 50 mg daily, atorvastatin 20 mg qHS  ROS: unable to obtain due to the patient’s condition and cognitive impairment.  PMHx: HTN, DM type 2, HLD, CKD 3, cognitive impairment  SurHx: Cataract surgery  FHx: HTN and DM  SocHx: no tobacco, alcohol or illicit drug use    Exam:  Vitals: BP = 97/53; P = 67; T = 97.2; SpO2 of 99 on 5 L/min via NC  General: resting in bed; appears stated age  Eyes: anicteric sclerae; dry conjunctiva  HENT: AT/NC; clear oropharynx w/ dry mucous membranes; no ulcers/exudates  Neck: supple; trachea midline  Lungs: lungs cta b/l; non labored breathing  CVS: RRR; S1 and S2 w/o MRG’s  Abd: BS+; non tender to palpation; soft; no HSM  Extremities: LE’s w/o peripheral edema; pulses 2+ b/l  Psych: appropriate affect; Alert  Skin: no rashes, ulcers or nodules noted except for a sacral decubitus ulcer    Labs significant for WBC 13.41, H&H 17.3/57, .8, d-dimer 4391, INR 1.3, d-dimer 4,391, Na 174 > 171 > 169, AG 35, BUN/Cr 117/2.8, gluc 339, AST//191, lipase 227, procal 0.23, ferritin 676, CRP 0.84, trop 0.14, proBNP 5,132, pH 7.49/33/92  CT head: cerebral atrophy  CXR: no acute cardiopulmonary process  EKG: NSR; qT 505    Assessment:  (1) Generalized weakness w/ anorexia and vomiting: failure to thrive possibly 2/2 progression of cognitive impairment; patient likely has prolonged debility, is underweight with temporal atrophy, has cerebral atrophy on CT, is profoundly hyperosmolar (dehydrated) and hypernatremic and has a sacral ulcer, all of which suggest a limited baseline level of functionality; can r/o infectious etiology for delirium / metabolic encephalopathy  (2) R/o SARS-CoV-2 infection  (3) BROOKS on underlying CKD 3 likely prerenal  (4) High AG metabolic acidosis likely secondary to renal failure and lactic acidosis  (5) DM w/ hyperglycemia  (6) Cardiac enzyme elevation likely demand ischemia and due to decreased excretion (renal failure)  (7) Abnormal LFT’s likely 2/2 infectious process  (8) HTN / HLD – underlying ailments    Plan:  (1) F/u blood and urine cultures (in lab)  (2) Use D5 ½ NS (give one liter at 250 mL/hr and then continue at 75 mL/hr)   (3) Monitor intake and output  (4) BMP q8hrly  (5) Check B12, folate and TSH  (6) Give thiamine and MVI  (7) Supportive care  (8) F/u covid-19 PCR  (9) Hold off on HCQ for now; no hypoxia or CXR changes  (10) Can monitor off antibiotics; procal is 0.23 w/ no obvious source of infection  (11) Topical wound care and decubitus precautions and offloading for the decubitus ulcer  (12) Trend BUN/Cr  (13) Nephrology evaluation  (14) Blood glucose monitoring qAC and qHS w/ basal bolus regimen – goal BG < 180 mg/dL  (15) Hypoglycemia protocol PRN  (16) Trend cardiac enzyme levels  (17) Trend LFT’s  (18) Medications as dosed  (19) Dispo: SNF vs. home w/ home care when medically stable    Code status: DNR/DNI

## 2020-04-25 NOTE — H&P ADULT - ASSESSMENT
A 89-year-old female with a PMH of HTN, type II DM, DLD, CKD stage III, and cognitive impairment who presented with generalized weakness for the past one week and decreased oral intake.    1. Generalized weakness  - DDx includes infection (r/o COVID-19) vs. BROOKS vs. hypernatremia  - sepsis present on admission  - CXR: no evidence of acute cardio-pulmonary disease  - f/u COVID-19 PCR and blood cultures    2. BROOKS on CKD stage III with HAGMA  - likely pre-renal etiology  - last Cr was 1.1; was 2.8 on admission  - check U/A and urine electrolytes  - nephrology consult pending    3. Hypernatremia  - corrected sodium level was 178  - calculated free water deficit is 7.3 L  - check serum and urine osmolality  - start D5W at 75 cc/hr    4. Elevated troponin  - likely demand ischemia  - EKG: sinus rhythm with TWI in leads V1-V3 (TWI seen on leads V1-V2 from last EKG from 9/2018)  - pro-BNP: 5132  - check repeat cardiac enzymes    5. Elevated liver enzymes A 89-year-old female with a PMH of HTN, type II DM, DLD, CKD stage III, and cognitive impairment who presented with generalized weakness for the past one week and decreased oral intake.    1. Generalized weakness  - DDx includes infection (r/o COVID-19) vs. BROOKS vs. hypernatremia  - sepsis present on admission  - CXR: no evidence of acute cardio-pulmonary disease  - f/u COVID-19 PCR and blood cultures    2. BROOKS on CKD stage III with HAGMA  - likely pre-renal etiology  - last Cr was 1.1; was 2.8 on admission  - check U/A and urine electrolytes  - nephrology consult pending    3. Hypernatremia  - likely due to dehydration  - corrected sodium level was 178  - calculated free water deficit is 7.3 L  - urine osmolality: 608  - check serum osmolality  - start D5W at 75 cc/hr    4. Elevated troponin  - likely demand ischemia  - EKG: sinus rhythm with TWI in leads V1-V3 (TWI seen on leads V1-V2 from last EKG from 9/2018)  - pro-BNP: 5132  - check repeat cardiac enzymes    5. Elevated liver enzymes  - DDx includes sepsis   - check THAD, AMA, ASMA, iron studies, ceruloplasmin, anti-LKM1, hepatitis panel,     6. HTN  - hold Atenolol as patient's BP currently low-normal; resume when BP stable    7. Type II DM  - hold Metformin  - monitor FS and start insulin if FS persistently >180    8. Cognitive impairment likely dementia  - continue supportive measures per nursing staff    9. DLD  - hold statin due to elevated LFTs    10. DVT prophylaxis  - heparin SQ BID    11. GI prophylaxis  - PPI daily    12. Disposition  - anticipate discharge home vs. SNF once medically stable  - overall prognosis is poor; consider palliative care evaluation  - DNR/DNI A 89-year-old female with a PMH of HTN, type II DM, DLD, CKD stage III, and cognitive impairment who presented with generalized weakness for the past one week and decreased oral intake.    1. Generalized weakness  - DDx includes infection (r/o COVID-19) vs. BROOKS vs. hypernatremia  - sepsis present on admission  - CXR: no evidence of acute cardio-pulmonary disease  - no source of infection at present;  f/u COVID-19 PCR and blood culture results    2. BROOKS on CKD stage III with HAGMA  - likely pre-renal etiology  - last Cr was 1.1; was 2.8 on admission  - check U/A and urine electrolytes  - nephrology consult pending    3. Hypernatremia  - likely due to dehydration  - corrected sodium level was 178  - calculated free water deficit is 7.3 L  - urine osmolality: 608  - check serum osmolality  - start D5W at 75 cc/hr; monitor FS    4. Elevated troponin  - likely demand ischemia  - EKG: sinus rhythm with TWI in leads V1-V3 (TWI seen on leads V1-V2 from last EKG from 9/2018)  - pro-BNP: 5132  - check repeat cardiac enzymes    5. Elevated liver enzymes  - DDx includes sepsis   - check THAD, AMA, ASMA, iron studies, ceruloplasmin, anti-LKM1, hepatitis panel,     6. HTN  - hold Atenolol as patient's BP currently low-normal; resume when BP stable    7. Type II DM  - hold Metformin  - monitor FS and start insulin if FS persistently >180    8. Cognitive impairment likely dementia  - continue supportive measures per nursing staff    9. DLD  - hold statin due to elevated LFTs    10. DVT prophylaxis  - heparin SQ BID    11. GI prophylaxis  - PPI daily    12. Disposition  - anticipate discharge home vs. SNF once medically stable  - overall prognosis is poor; consider palliative care evaluation  - DNR/DNI A 89-year-old female with a PMH of HTN, type II DM, DLD, CKD stage III, and cognitive impairment who presented with generalized weakness for the past one week and decreased oral intake.    1. Generalized weakness  - DDx includes infection (r/o COVID-19) vs. BROOKS vs. hypernatremia  - sepsis present on admission  - CXR: no evidence of acute cardio-pulmonary disease  - no source of infection at present;  f/u COVID-19 PCR and blood culture results    2. BROOKS on CKD stage III with HAGMA  - likely pre-renal etiology  - last Cr was 1.1; was 2.8 on admission  - FENa is 0.4% (pre-renal)  - start D5W at 75 cc/hr; monitor FS  - nephrology consult pending    3. Hypernatremia  - likely due to dehydration  - corrected sodium level is 178  - calculated free water deficit is 7.3 L  - urine osmolality: 608  - check serum osmolality and repeat BMP  - start D5W at 75 cc/hr; monitor FS    4. Elevated troponin  - likely demand ischemia  - EKG: sinus rhythm with TWI in leads V1-V3 (TWI seen on leads V1-V2 from last EKG from 9/2018)  - pro-BNP: 5132  - check repeat cardiac enzymes    5. Elevated liver enzymes  - DDx includes sepsis   - check THAD, AMA, ASMA, iron studies, ceruloplasmin, anti-LKM1, hepatitis panel,     6. HTN  - hold Atenolol as patient's BP currently low-normal; resume when BP stable    7. Type II DM  - hold Metformin  - monitor FS and start insulin if FS persistently >180    8. Cognitive impairment likely dementia  - continue supportive measures per nursing staff    9. DLD  - hold statin due to elevated LFTs    10. DVT prophylaxis  - heparin SQ BID    11. GI prophylaxis  - PPI daily    12. Disposition  - anticipate discharge home vs. SNF once medically stable  - overall prognosis is poor; consider palliative care evaluation  - NPO until speech/swallow evaluation  - DNR/DNI A 89-year-old female with a PMH of HTN, type II DM, DLD, CKD stage III, and cognitive impairment who presented with generalized weakness for the past one week and decreased oral intake.    1. Generalized weakness  - DDx includes infection (r/o COVID-19) vs. BROOKS vs. hypernatremia  - sepsis present on admission  - CXR: no evidence of acute cardio-pulmonary disease  - U/A: no leukocytes, no nitrites  - no obvious source of infection at present;  f/u COVID-19 PCR and blood culture results    2. BROOKS on CKD stage III with HAGMA  - likely pre-renal etiology vs. ATN  - last Cr was 1.1; was 2.8 on admission  - FENa is 0.4% (pre-renal)  - start D5W at 75 cc/hr; monitor FS  - nephrology consult pending    3. Hypernatremia  - likely due to dehydration  - corrected sodium level is 178  - calculated free water deficit is 7.3 L  - urine osmolality: 608  - check serum osmolality and repeat BMP  - start D5W at 75 cc/hr; monitor FS    4. Elevated troponin  - likely demand ischemia  - EKG: sinus rhythm with TWI in leads V1-V3 (TWI seen on leads V1-V2 from last EKG from 9/2018)  - pro-BNP: 5132  - check repeat cardiac enzymes    5. Elevated liver enzymes  - DDx includes sepsis   - check THAD, AMA, ASMA, iron studies, ceruloplasmin, anti-LKM1, hepatitis panel,     6. HTN  - hold Atenolol as patient's BP currently low-normal; resume when BP stable    7. Type II DM  - hold Metformin  - monitor FS and start insulin if FS persistently >180    8. Cognitive impairment likely dementia  - continue supportive measures per nursing staff    9. DLD  - hold statin due to elevated LFTs    10. DVT prophylaxis  - heparin SQ BID    11. GI prophylaxis  - PPI daily    12. Disposition  - anticipate discharge home vs. SNF once medically stable  - overall prognosis is poor; consider palliative care evaluation  - NPO until speech/swallow evaluation  - DNR/DNI

## 2020-04-25 NOTE — ED PROVIDER NOTE - PROGRESS NOTE DETAILS
PODLOG: PT DNI/DNR. daughter crystal arenas [phone]   grandosn jaylen owens [phone] discussed with MAR; will wait on heparin, they will follow for hospice; consideration of dialysis V/q study. admitted for further management and care. Pt treated during COVID pandemic.

## 2020-04-25 NOTE — H&P ADULT - NSHPLABSRESULTS_GEN_ALL_CORE
Labs:                        17.3   13.41 )-----------( 189      ( 25 Apr 2020 15:00 )             57.0             04-25    174<HH>  |  122<H>  |  117<HH>  ----------------------------<  339<H>  4.1   |  17  |  2.8<H>    Ca    9.5      25 Apr 2020 15:00    TPro  7.9  /  Alb  4.1  /  TBili  0.7  /  DBili  x   /  AST  287<H>  /  ALT  191<H>  /  AlkPhos  108  04-25    LIVER FUNCTIONS - ( 25 Apr 2020 15:00 )  Alb: 4.1 g/dL / Pro: 7.9 g/dL / ALK PHOS: 108 U/L / ALT: 191 U/L / AST: 287 U/L / GGT: x         PT/INR - ( 25 Apr 2020 15:00 )   PT: 14.90 sec;   INR: 1.30 ratio     PTT - ( 25 Apr 2020 15:00 )  PTT:21.7 sec  CARDIAC MARKERS ( 25 Apr 2020 15:00 )  x     / 0.14 ng/mL / x     / x     / x        < from: CT Head No Cont (04.25.20 @ 16:26) >  Impression  Cerebral atrophy.  < end of copied text >    < from: Xray Chest 1 View-PORTABLE IMMEDIATE (04.25.20 @ 15:51) >  Impression:    No radiographic evidence of acute cardiopulmonary disease.  < end of copied text >

## 2020-04-25 NOTE — ED PROVIDER NOTE - PHYSICAL EXAMINATION
Constitutional: Cachectic. Moans to pain.  Head: Normocephalic, atraumatic.  Eyes: PERRL. EOMI.  ENT: No nasal discharge. Mucous membranes dry.  Neck: Supple. Painless ROM.  Cardiovascular: Normal S1, S2. Tachycardic, regular. No murmurs, rubs, or gallops.  Pulmonary: Normal respiratory rate and effort. Lungs clear to auscultation bilaterally. No wheezing, rales, or rhonchi.  Abdominal: Soft. Nondistended. Nontender. No rebound, guarding, rigidity.  Extremities. Pelvis stable. No lower extremity edema, symmetric calves. + bilateral mottling? over knees and shins.  Skin: No cyanosis.  Neuro: Moans to pain. Does not move extremities.  Psych: Unable to assess.

## 2020-04-25 NOTE — ED PROVIDER NOTE - ATTENDING CONTRIBUTION TO CARE
I personally evaluated the patient. I reviewed the Resident’s or Physician Assistant’s note (as assigned above), and agree with the findings and plan except as documented in my note.    Pt is a 89 y/o female with hx of advanced dementia, DM, presents to ED for AMS for 1week, moderate, constant, worse since onset. + generalized weakness. Now bedbound. Pt also found to be tachypnic per EMS. Family denies any fever, cough, diarrhea, vomiting. Unable to obtain hx from pt due to AMS.    Constitutional: Cachectic. Moans to pain.  Head: Normocephalic, atraumatic.  Eyes: PERRL. EOMI.  ENT: No nasal discharge. Mucous membranes dry.  Neck: Supple. Painless ROM.  Cardiovascular: Normal S1, S2. Tachycardic, regular. No murmurs, rubs, or gallops.  Pulmonary: Normal respiratory rate and effort. Lungs clear to auscultation bilaterally. No wheezing, rales, or rhonchi.  Abdominal: Soft. Nondistended. Nontender. No rebound, guarding, rigidity.  Extremities. Pelvis stable. No lower extremity edema, symmetric calves. + bilateral mottling? over knees and shins.  Skin: No cyanosis.  Neuro: Moans to pain. Does not move extremities.  Psych: Unable to assess.

## 2020-04-25 NOTE — H&P ADULT - HISTORY OF PRESENT ILLNESS
The patient is a 89-year-old female with a PMH of type II DM, DLD, and cognitive impairment who presented with generalized weakness. The patient is a 89-year-old female with a PMH of HTN, type II DM, DLD, CKD stage III, and cognitive impairment who presented with generalized weakness for the past one week.  History was obtained from the patient's family members over the telephone due to the patient's mental status.  She has had decreased oral intake over the past 3-4 days.  At her baseline, the patient is non-verbal and ambulates with assistance.  However over the past one week she's been mostly bedbound.  The patient had an episode of vomiting today.  She also appeared to have labored breathing per family today.  Otherwise, the patient reportedly did not have fever, chills, abdominal pain, bowel or urinary complaints.  The patient's family denied any recent sick contacts.

## 2020-04-25 NOTE — ED ADULT NURSE NOTE - OBJECTIVE STATEMENT
BIBA, pt was prenotification, as per EMS, pt is from home, presents with AMS, pt responsive only to verbal stimuli, tachypneic, febrile, and hypoxic. Baseline, pt has advanced dementia.

## 2020-04-25 NOTE — ED ADULT NURSE NOTE - NS ED NURSE RECORD ANOTHER VITAL SIGN
Interval History:  >4 elements OR status of 3 inpatient conditions    Review of Systems   Unable to perform ROS: Mental status change     2 systems OR Unable to obtain a complete ROS due to level of consciousness.  Objective:     Vitals:  Temp: 100.1 °F (37.8 °C)  Pulse: 100  Rhythm: normal sinus rhythm  BP: (!) 91/53  MAP (mmHg): 68  Resp: (!) 21  SpO2: 98 %  Oxygen Concentration (%): 50  O2 Device (Oxygen Therapy): ventilator  Vent Mode: A/C  Set Rate: 17 BPM  Vt Set: 450 mL  PEEP/CPAP: 5 cmH20  Peak Airway Pressure: 24 cmH2O  Mean Airway Pressure: 3.5 cmH20  Plateau Pressure: 0 cmH20    Temp  Min: 97.8 °F (36.6 °C)  Max: 100.1 °F (37.8 °C)  Pulse  Min: 90  Max: 126  BP  Min: 90/55  Max: 195/95  MAP (mmHg)  Min: 68  Max: 101  Resp  Min: 15  Max: 30  SpO2  Min: 92 %  Max: 100 %  Oxygen Concentration (%)  Min: 50  Max: 100    02/25 0701 - 02/26 0700  In: 2728 [I.V.:348]  Out: 3575 [Urine:2975]   Unmeasured Output  Urine Occurrence: 1  Stool Occurrence: 1  Pad Count: 2       Physical Exam    Constitutional: No apparent distress.   Eyes: Conjunctiva clear, anicteric. Lids no lesions. Small abrasion under left eye   Head/Ears/Nose/Mouth/Throat/Neck: Moist mucous membranes. External ears, nose atraumatic.   Cardiovascular: Regular rhythm. ESM  Respiratory: clear  Gastrointestinal: No hernia. Soft, nondistended, nontender. + bowel sounds.      Neurologic Examination:    -Mental Status: Open eyes to voice. Doesn't follow commands   -Cranial nerves: Pupils equal, round, and reactive bilateral. Extraocular movements intact with VOR. Intact corneal reflexes bilateral, intact cough reflex -Motor: Moves all ext spon     Medications:  Continuous    heparin (porcine) in D5W Last Rate: 19 Units/kg/hr (02/26/20 1200)   propofol Last Rate: 25 mcg/kg/min (02/26/20 1300)   Scheduled    albuterol-ipratropium 3 mL Q4H   amantadine  mg BID   atorvastatin 40 mg QHS   chlorhexidine 15 mL BID   escitalopram oxalate 20 mg Daily    famotidine 20 mg BID   levetiracetam IVPB 750 mg Q12H   miconazole nitrate 2%  BID   PRN    acetaminophen 650 mg Q6H PRN   Dextrose 10% Bolus 12.5 g PRN   fentaNYL 50 mcg Q2H PRN   glucagon (human recombinant) 1 mg PRN   insulin aspart U-100 1-10 Units Q6H PRN   labetalol 10 mg Q4H PRN   magnesium oxide 800 mg PRN   magnesium oxide 800 mg PRN   ondansetron 4 mg Q8H PRN   potassium chloride 10% 40 mEq PRN   potassium chloride 10% 40 mEq PRN   potassium chloride 10% 60 mEq PRN   potassium, sodium phosphates 2 packet PRN   potassium, sodium phosphates 2 packet PRN   potassium, sodium phosphates 2 packet PRN   sodium chloride 0.9% 10 mL PRN     Today I personally reviewed pertinent medications, lines/drains/airways, imaging, cardiology results, laboratory results, microbiology results, notably:    Diet  Diet NPO  Diet NPO       Yes

## 2020-04-25 NOTE — ED PROVIDER NOTE - OBJECTIVE STATEMENT
89y/o F w/ dementia baseline walks with help, nonverbal-, DM, cholesterol, presents for increase weakness for the past 4 days, decrease po in take. no vomiting or diarrhea. no fevers at home.  no sick contacts.  EMS FS 300s O2 sat 98 on 6L of O2.

## 2020-04-25 NOTE — H&P ADULT - NSHPPHYSICALEXAM_GEN_ALL_CORE
Vital Signs Last 24 Hrs  T(C): 36.8 (25 Apr 2020 17:31), Max: 38.1 (25 Apr 2020 15:15)  T(F): 98.3 (25 Apr 2020 17:31), Max: 100.6 (25 Apr 2020 15:15)  HR: 92 (25 Apr 2020 17:31) (92 - 115)  BP: 130/76 (25 Apr 2020 17:31) (92/60 - 140/82)  BP(mean): 71 (25 Apr 2020 15:24) (71 - 71)  RR: 30 (25 Apr 2020 17:31) (30 - 40)  SpO2: 100% (25 Apr 2020 17:31) (83% - 100%)    POCT Blood Glucose.: 304 mg/dL (25 Apr 2020 15:00)    Physical Exam:    -     General :     -      HEENT:    -      Cardiac:    -      Pulm:    -      GI:    -      Musculoskeletal:    -      Neuro: Vital Signs Last 24 Hrs  T(C): 36.8 (25 Apr 2020 17:31), Max: 38.1 (25 Apr 2020 15:15)  T(F): 98.3 (25 Apr 2020 17:31), Max: 100.6 (25 Apr 2020 15:15)  HR: 92 (25 Apr 2020 17:31) (92 - 115)  BP: 130/76 (25 Apr 2020 17:31) (92/60 - 140/82)  BP(mean): 71 (25 Apr 2020 15:24) (71 - 71)  RR: 30 (25 Apr 2020 17:31) (30 - 40)  SpO2: 100% (25 Apr 2020 17:31) (83% - 100%)    POCT Blood Glucose.: 304 mg/dL (25 Apr 2020 15:00)    Physical Exam:    -     General : awake, non-verbal and not alert    -      HEENT: normocephalic    -      Cardiac: RRR    -      Pulm: CTA B/L    -      GI: abdomen soft, non-tender    -      Musculoskeletal: no lower extremity edema; reticular rash noted over bilateral lower extremity    -      Neuro: unable to assess Vital Signs Last 24 Hrs  T(C): 36.8 (25 Apr 2020 17:31), Max: 38.1 (25 Apr 2020 15:15)  T(F): 98.3 (25 Apr 2020 17:31), Max: 100.6 (25 Apr 2020 15:15)  HR: 92 (25 Apr 2020 17:31) (92 - 115)  BP: 130/76 (25 Apr 2020 17:31) (92/60 - 140/82)  BP(mean): 71 (25 Apr 2020 15:24) (71 - 71)  RR: 30 (25 Apr 2020 17:31) (30 - 40)  SpO2: 100% (25 Apr 2020 17:31) (83% - 100%)    POCT Blood Glucose.: 304 mg/dL (25 Apr 2020 15:00)    Physical Exam:    -     General : awake, non-verbal and not alert    -      HEENT: normocephalic    -      Cardiac: RRR    -      Pulm: CTA B/L    -      GI: abdomen soft, non-tender    -      Musculoskeletal: no lower extremity edema; reticular rash noted over bilateral lower extremity    -      Neuro: unable to assess completely but grimacing to painful stimuli

## 2020-04-26 NOTE — CHART NOTE - NSCHARTNOTEFT_GEN_A_CORE
Pt seen by lisa in am  Pt seen and examined at bedside. vitals and exam stable. Chart reviewed. Discussed plan with resident.   severe dehydration continue fluids  hypernatremia- follow up bmp adjust as need  follow up covid labs  lab derangements likely 2/2 dehydration

## 2020-04-26 NOTE — CONSULT NOTE ADULT - SUBJECTIVE AND OBJECTIVE BOX
NEPHROLOGY CONSULTATION NOTE    Pt is poor historian. hx taken from chart.  Patient is a 89y Female PMH of HTN, type II DM, DLD, CKD stage III, and cognitive impairment presented with generalized weakness, decreased oral intake.  At her baseline, the patient is non-verbal.  PT had an episode of vomiting on day of presentation and laboured breathing.    PAST MEDICAL & SURGICAL HISTORY:  High cholesterol  Diabetes  Advanced dementia  S/P cataract surgery    Allergies:  No Known Allergies    Home Medications:  atenolol 50 mg oral tablet: 1 tab(s) orally once a day (2020 20:01)  atorvastatin 20 mg oral tablet: 1 tab(s) orally once a day (at bedtime) (2020 20:01)  metFORMIN 500 mg oral tablet: 1 tab(s) orally 2 times a day (2020 20:)    Hospital Medications:   MEDICATIONS  (STANDING):  chlorhexidine 4% Liquid 1 Application(s) Topical <User Schedule>  heparin   Injectable 5000 Unit(s) SubCutaneous every 12 hours  multivitamin 1 Tablet(s) Oral daily  pantoprazole    Tablet 40 milliGRAM(s) Oral before breakfast  potassium chloride  20 mEq/100 mL IVPB 20 milliEquivalent(s) IV Intermittent every 4 hours  sodium chloride 0.45%. 1000 milliLiter(s) (75 mL/Hr) IV Continuous <Continuous>  thiamine 100 milliGRAM(s) Oral daily      SOCIAL HISTORY:  Denies ETOH,Smoking,   FAMILY HISTORY:        REVIEW OF SYSTEMS:    All other review of systems is negative unless indicated above.    VITALS:  T(F): 98 (20 @ 05:44), Max: 100.6 (20 @ 15:15)  HR: 77 (20 @ 05:44)  BP: 100/60 (20 @ 08:23)  RR: 18 (20 @ 05:44)  SpO2: 97% (20 @ 08:23)     @ 07:01  -   @ 07:00  --------------------------------------------------------  IN: 850 mL / OUT: 500 mL / NET: 350 mL      Height (cm): 152.4 ( @ 20:25)  Weight (kg): 44 ( @ 20:25)  BMI (kg/m2): 18.9 ( 20:25)  BSA (m2): 1.37 ( @ 20:25)    20 @ 07:01  -  20 @ 07:00  --------------------------------------------------------  IN: 0 mL / OUT: 500 mL / NET: -500 mL      I&O's Detail    2020 07:  -  2020 07:00  --------------------------------------------------------  IN:    dextrose 5%.: 600 mL    dextrose 5%.: 250 mL  Total IN: 850 mL    OUT:    Indwelling Catheter - Urethral: 500 mL  Total OUT: 500 mL    Total NET: 350 mL        Creatine Kinase, Serum: 1184 U/L (20 @ 07:31)      PHYSICAL EXAM:  Constitutional: NAD  Respiratory: CTAB, no wheezes, rales or rhonchi  Cardiovascular: S1, S2, RRR  Gastrointestinal: BS+, soft, NT/ND  Extremities: No peripheral edema  Neurological: A/O x 1  : + bond.     Vascular Access:    LABS:      165<HH>  |  118<H>  |  106<HH>  ----------------------------<  356<H>  3.1<L>   |  27  |  1.9<H>    Ca    8.6      2020 07:31  Phos  3.0       Mg     2.9         TPro  5.8<L>  /  Alb  2.8<L>  /  TBili  0.6  /  DBili      /  AST  138<H>  /  ALT  103<H>  /  AlkPhos  76      Creatinine Trend: 1.9 <--, 2.1 <--, 1.7 <--, 2.8 <--    SODIUM TREND:  Sodium 165 [ 07:31]  Sodium 169 [ @ 23:30]  Sodium 171 [ @ 21:54]  Sodium 174 [ @ 15:00]                       13.3   6.50  )-----------( 109      ( 2020 07:31 )             44.2     Blood Gas Profile - Arterial (20 @ 00:26)    pH, Arterial: 7.49    pCO2, Arterial: 33 mmHg    pO2, Arterial: 92 mmHg    HCO3, Arterial: 26 mmoL/L    Base Excess, Arterial: 2.4 mmoL/L    Oxygen Saturation, Arterial: 98 %    Troponin T, Serum: 0.08: Critical value: ng/mL (20 @ 07:31)  Serum Pro-Brain Natriuretic Peptide: 5132 pg/mL (20 @ 15:00)      Urine Studies:  Urinalysis Basic - ( 2020 20:00 )    Color: Yellow / Appearance: Clear / S.023 / pH:   Gluc:  / Ketone: Negative  / Bili: Negative / Urobili: <2 mg/dL   Blood:  / Protein: 100 mg/dL / Nitrite: Negative   Leuk Esterase: Negative / RBC: 2 /HPF / WBC 3 /HPF   Sq Epi:  / Non Sq Epi: 6 /HPF / Bacteria: Negative      Creatinine, Random Urine: 95 mg/dL ( @ 20:00)  Sodium, Random Urine: 25.0 mmoL/L ( 20:00)  Osmolality, Random Urine: 608 mos/kg ( 20:00)    RADIOLOGY & ADDITIONAL STUDIES:  < from: CT Head No Cont (20 @ 16:26) >  Impression  Cerebral atrophy.    < end of copied text >    < from: Xray Chest 1 View-PORTABLE IMMEDIATE (20 @ 15:51) >  Impression:      No radiographic evidence of acute cardiopulmonary disease.    < end of copied text >

## 2020-04-26 NOTE — CONSULT NOTE ADULT - SUBJECTIVE AND OBJECTIVE BOX
89y  Female  HPI:  The patient is a 89-year-old female with a PMH of HTN, type II DM, DLD, CKD stage III, and cognitive impairment who presented with generalized weakness for the past one week.  History was obtained from the patient's family members over the telephone due to the patient's mental status.  She has had decreased oral intake over the past 3-4 days.  At her baseline, the patient is non-verbal and ambulates with assistance.  However over the past one week she's been mostly bedbound.  The patient had an episode of vomiting today.  She also appeared to have labored breathing per family today.  Otherwise, the patient reportedly did not have fever, chills, abdominal pain, bowel or urinary complaints.  The patient's family denied any recent sick contacts. (25 Apr 2020 18:44)    Hospital course***  Allergies    No Known Allergies    Intolerances      PAST MEDICAL & SURGICAL HISTORY:  High cholesterol  Diabetes  Advanced dementia  S/P cataract surgery      Labs:                        13.3   6.50  )-----------( 109      ( 26 Apr 2020 07:31 )             44.2       PE:  PHYSICAL EXAM: Awake alert  Partial thickness wound to sacrum 1x 0.5 cm approx  100% epi  small serosang dc  no sign of infection

## 2020-04-26 NOTE — CONSULT NOTE ADULT - ASSESSMENT
ASSESSMENT:  Stage  II pressure ulcer to sacrum  Not Infected    RECOMMENDATION:  Wound care - Hydrogel/DPD BID  Offloading/ positional changes  Will follow.

## 2020-04-26 NOTE — CONSULT NOTE ADULT - ASSESSMENT
4y Female PMH of HTN, type II DM, DLD, CKD stage III, and cognitive impairment presented with generalized weakness, decreased oral intake.    # BROOKS / hypernatremia   - BROOKS likely prerenal.    - serum creatinine improving   - non-oliguric   - hypokalemia noted. ok to give single dose of iv KCl 20 meq. monitor levels closely.   - hypernatremia due to free water deficit. cont. 0.45% saline. Na level improving.   - UA, urine lytes noted.   - ABG noted for alkalemia.   - corrected Ca, Mg, phos at goal.   - BP noted. keep MAP > 65   - Hb at goal.   - avoid nephrotoxins and hypotension   - no need for RRT   - will follow

## 2020-04-26 NOTE — CONSULT NOTE ADULT - ATTENDING COMMENTS
I was Physically Present for the key portions of the evaluation   I agree with the above History  , Physical examination Assessment and plan   I have Reviewed , Modified or appended where appropriate.  Please check A and P as above   1- BROOKS/ hypernatremia hypokalemia   agree with 1/2 ns at 100 cc per hour  replete K  check Ua and urine lytes   follow BMP  will follow

## 2020-04-27 NOTE — DIETITIAN INITIAL EVALUATION ADULT. - DIET TYPE
Unable to obtain nutr hx, placed a call to pt's daughter - no answer. Per EMR notes pt. with decreased PO intake 3-4 days PTP. NKFA per EMR. No previous visits to compare weight trends. Currently NPO, failed SLP eval x 2.

## 2020-04-27 NOTE — DIETITIAN INITIAL EVALUATION ADULT. - ENERGY NEEDS
calorie 921-1152kcal (MSJ x 1.2-1.5 AF) for PU, low BMI  protein 53-61g (1.2-1.4g/kg CBW) as above + CKD considered   fluid 1ml/kcal or per LIP

## 2020-04-27 NOTE — SWALLOW BEDSIDE ASSESSMENT ADULT - NS SPL SWALLOW CLINIC TRIAL FT
Severe/profound oral dysphagia with puree 2/2 bolus holding and unawareness of bolus in oral cavity. PO trial removed by SLP. Further PO trials not appropriate at this time 2/2 high aspiration risk.
Severe/profound oral dysphagia with puree 2/2 bolus holding and unawareness of bolus in oral cavity. PO trial removed by SLP. Further PO trials not appropriate at this time 2/2 high aspiration risk.

## 2020-04-27 NOTE — PROGRESS NOTE ADULT - SUBJECTIVE AND OBJECTIVE BOX
Pt seen and examined at bedside. No CP or SOB.    PAST MEDICAL & SURGICAL HISTORY:  High cholesterol  Diabetes  Advanced dementia  S/P cataract surgery      VITAL SIGNS (Last 24 hrs):  T(C): 36.4 (20 @ 14:00), Max: 36.4 (20 @ 05:17)  HR: 87 (20 @ 14:00) (87 - 92)  BP: 108/55 (20 @ 14:00) (95/55 - 117/57)  RR: 19 (20 @ 14:00) (18 - 20)  SpO2: 98% (20 @ 11:12) (97% - 98%)  Wt(kg): --  Daily Height in cm: 152.4 (2020 12:24)    Daily Weight in k.8 (2020 05:17)    I&O's Summary    2020 07:  -  2020 07:00  --------------------------------------------------------  IN: 900 mL / OUT: 475 mL / NET: 425 mL    2020 07:  -  2020 15:02  --------------------------------------------------------  IN: 0 mL / OUT: 300 mL / NET: -300 mL        PHYSICAL EXAM:  GENERAL: NAD, well-developed  HEAD:  Atraumatic, Normocephalic  EYES: EOMI, PERRLA, conjunctiva and sclera clear  NECK: Supple, No JVD  CHEST/LUNG: Clear to auscultation bilaterally; No wheeze  HEART: Regular rate and rhythm; No murmurs, rubs, or gallops  ABDOMEN: Soft, Nontender, Nondistended; Bowel sounds present  EXTREMITIES:  2+ Peripheral Pulses, No clubbing, cyanosis, or edema  PSYCH: AAOx3  NEUROLOGY: non-focal  SKIN: No rashes or lesions    Labs Reviewed  Spoke to patient in regards to abnormal labs.    CBC Full  -  ( 2020 13:50 )  WBC Count : 16.85 K/uL  Hemoglobin : 11.6 g/dL  Hematocrit : 38.9 %  Platelet Count - Automated : 85 K/uL  Mean Cell Volume : 99.0 fL  Mean Cell Hemoglobin : 29.5 pg  Mean Cell Hemoglobin Concentration : 29.8 g/dL  Auto Neutrophil # : 15.96 K/uL  Auto Lymphocyte # : 0.56 K/uL  Auto Monocyte # : 0.31 K/uL  Auto Eosinophil # : 0.00 K/uL  Auto Basophil # : 0.00 K/uL  Auto Neutrophil % : 94.8 %  Auto Lymphocyte % : 3.3 %  Auto Monocyte % : 1.8 %  Auto Eosinophil % : 0.0 %  Auto Basophil % : 0.0 %    BMP:     @ 18:18    Blood Urea Nitrogen - 103  Calcium - 8.4  Carbond Dioxide - 21  Chloride - 119  Creatinine - 1.5  Glucose - 140  Potassium - 4.6  Sodium - 159      Hemoglobin A1c -   PT/INR - ( 2020 15:00 )   PT: 14.90 sec;   INR: 1.30 ratio         PTT - ( 2020 15:00 )  PTT:21.7 sec  Urine Culture:   @ 20:00 Urine culture: --    Culture Results:   No growth  Method Type: --  Organism: --  Organism Identification: --  Specimen Source: .Urine Catheterized   @ 16:10 Urine culture: --    Culture Results:   No growth to date.  Method Type: --  Organism: --  Organism Identification: --  Specimen Source: .Blood Blood        Imaging reviewed      MEDICATIONS  (STANDING):  chlorhexidine 4% Liquid 1 Application(s) Topical <User Schedule>  heparin   Injectable 5000 Unit(s) SubCutaneous every 12 hours  multivitamin 1 Tablet(s) Oral daily  pantoprazole    Tablet 40 milliGRAM(s) Oral before breakfast  sodium chloride 0.45%. 1000 milliLiter(s) (75 mL/Hr) IV Continuous <Continuous>  thiamine 100 milliGRAM(s) Oral daily    MEDICATIONS  (PRN): Pt seen and examined at bedside. unable to get ros. grimamadonna when touch abd      PAST MEDICAL & SURGICAL HISTORY:  High cholesterol  Diabetes  Advanced dementia  S/P cataract surgery      VITAL SIGNS (Last 24 hrs):  T(C): 36.4 (20 @ 14:00), Max: 36.4 (20 @ 05:17)  HR: 87 (20 @ 14:00) (87 - 92)  BP: 108/55 (20 @ 14:00) (95/55 - 117/57)  RR: 19 (20 @ 14:00) (18 - 20)  SpO2: 98% (20 @ 11:12) (97% - 98%)  Wt(kg): --  Daily Height in cm: 152.4 (2020 12:24)    Daily Weight in k.8 (2020 05:17)    I&O's Summary    2020 07:  -  2020 07:00  --------------------------------------------------------  IN: 900 mL / OUT: 475 mL / NET: 425 mL    2020 07:01  -  2020 15:02  --------------------------------------------------------  IN: 0 mL / OUT: 300 mL / NET: -300 mL        PHYSICAL EXAM:  GENERAL: NAD,  HEAD:  Atraumatic, Normocephalic  EYES: EOMI, PERRLA, conjunctiva and sclera clear  NECK: Supple, No JVD  CHEST/LUNG: Clear to auscultation bilaterally; No wheeze  HEART: Regular rate and rhythm; No murmurs, rubs, or gallops  ABDOMEN: Soft, tender, Nondistended; Bowel sounds present  EXTREMITIES:  2+ Peripheral Pulses, No clubbing, cyanosis, or edema  PSYCH: stuperous   NEUROLOGY: right facial droop, unknown if before, family thinks prior  SKIN: No rashes or lesions    Labs Reviewed  Spoke to patient in regards to abnormal labs.    CBC Full  -  ( 2020 13:50 )  WBC Count : 16.85 K/uL  Hemoglobin : 11.6 g/dL  Hematocrit : 38.9 %  Platelet Count - Automated : 85 K/uL  Mean Cell Volume : 99.0 fL  Mean Cell Hemoglobin : 29.5 pg  Mean Cell Hemoglobin Concentration : 29.8 g/dL  Auto Neutrophil # : 15.96 K/uL  Auto Lymphocyte # : 0.56 K/uL  Auto Monocyte # : 0.31 K/uL  Auto Eosinophil # : 0.00 K/uL  Auto Basophil # : 0.00 K/uL  Auto Neutrophil % : 94.8 %  Auto Lymphocyte % : 3.3 %  Auto Monocyte % : 1.8 %  Auto Eosinophil % : 0.0 %  Auto Basophil % : 0.0 %    BMP:     @ 18:18    Blood Urea Nitrogen - 103  Calcium - 8.4  Carbond Dioxide - 21  Chloride - 119  Creatinine - 1.5  Glucose - 140  Potassium - 4.6  Sodium - 159      Hemoglobin A1c -   PT/INR - ( 2020 15:00 )   PT: 14.90 sec;   INR: 1.30 ratio         PTT - ( 2020 15:00 )  PTT:21.7 sec  Urine Culture:   @ 20:00 Urine culture: --    Culture Results:   No growth  Method Type: --  Organism: --  Organism Identification: --  Specimen Source: .Urine Catheterized   @ 16:10 Urine culture: --    Culture Results:   No growth to date.  Method Type: --  Organism: --  Organism Identification: --  Specimen Source: .Blood Blood        Imaging reviewed      MEDICATIONS  (STANDING):  chlorhexidine 4% Liquid 1 Application(s) Topical <User Schedule>  heparin   Injectable 5000 Unit(s) SubCutaneous every 12 hours  multivitamin 1 Tablet(s) Oral daily  pantoprazole    Tablet 40 milliGRAM(s) Oral before breakfast  sodium chloride 0.45%. 1000 milliLiter(s) (75 mL/Hr) IV Continuous <Continuous>  thiamine 100 milliGRAM(s) Oral daily    MEDICATIONS  (PRN):

## 2020-04-27 NOTE — PROGRESS NOTE ADULT - SUBJECTIVE AND OBJECTIVE BOX
Nephrology progress note    THIS IS AN INCOMPLETE NOTE . FULL NOTE TO FOLLOW SHORTLY    Patient is seen and examined, events over the last 24 h noted .    Allergies:  No Known Allergies    Hospital Medications:   MEDICATIONS  (STANDING):  chlorhexidine 4% Liquid 1 Application(s) Topical <User Schedule>  heparin   Injectable 5000 Unit(s) SubCutaneous every 12 hours  multivitamin 1 Tablet(s) Oral daily  pantoprazole    Tablet 40 milliGRAM(s) Oral before breakfast  sodium chloride 0.45%. 1000 milliLiter(s) (75 mL/Hr) IV Continuous <Continuous>  thiamine 100 milliGRAM(s) Oral daily        VITALS:  T(F): 97.6 (20 @ 05:17), Max: 97.6 (20 @ 05:17)  HR: 91 (20 @ 05:17)  BP: 95/55 (20 @ 05:17)  RR: 20 (20 @ 05:17)  SpO2: 97% (20 @ 05:17)  Wt(kg): --     @ 07:  -   @ 07:00  --------------------------------------------------------  IN: 850 mL / OUT: 500 mL / NET: 350 mL     @ 07:01  -   @ 07:00  --------------------------------------------------------  IN: 900 mL / OUT: 475 mL / NET: 425 mL          PHYSICAL EXAM:  Constitutional: NAD  HEENT: anicteric sclera, oropharynx clear, MMM  Neck: No JVD  Respiratory: CTAB, no wheezes, rales or rhonchi  Cardiovascular: S1, S2, RRR  Gastrointestinal: BS+, soft, NT/ND  Extremities: No cyanosis or clubbing. No peripheral edema  :  No bond.   Skin: No rashes    LABS:      159<H>  |  119<H>  |  103<HH>  ----------------------------<  140<H>  4.6   |  21  |  1.5  Creatinine Trend: 1.5<--, 1.9<--, 2.1<--, 1.7<--, 2.8<--  SODIUM TREND:  Sodium 159 [ @ 18:18]  Sodium 165 [ @ 07:31]  Sodium 169 [ @ 23:30]  Sodium 171 [ @ 21:54]  Sodium 174 [ @ 15:00]    Ca    8.4<L>      2020 18:18  Phos  3.0       Mg     2.9         TPro  5.6<L>  /  Alb  2.9<L>  /  TBili  1.1  /  DBili      /  AST  144<H>  /  ALT  95<H>  /  AlkPhos  83                            14.1   13.86 )-----------( 97       ( 2020 18:18 )             45.6       Urine Studies:  Urinalysis Basic - ( 2020 20:00 )    Color: Yellow / Appearance: Clear / S.023 / pH:   Gluc:  / Ketone: Negative  / Bili: Negative / Urobili: <2 mg/dL   Blood:  / Protein: 100 mg/dL / Nitrite: Negative   Leuk Esterase: Negative / RBC: 2 /HPF / WBC 3 /HPF   Sq Epi:  / Non Sq Epi: 6 /HPF / Bacteria: Negative      Creatinine, Random Urine: 95 mg/dL ( @ 20:00)  Sodium, Random Urine: 25.0 mmoL/L ( @ 20:00)  Osmolality, Random Urine: 608 mos/kg ( @ 20:00)    RADIOLOGY & ADDITIONAL STUDIES: Nephrology progress note  Patient is seen and examined, events over the last 24 h noted .  confused   opens eyes to stimuli     Allergies:  No Known Allergies    Hospital Medications:   MEDICATIONS  (STANDING):  chlorhexidine 4% Liquid 1 Application(s) Topical <User Schedule>  heparin   Injectable 5000 Unit(s) SubCutaneous every 12 hours  multivitamin 1 Tablet(s) Oral daily  pantoprazole    Tablet 40 milliGRAM(s) Oral before breakfast  sodium chloride 0.45%. 1000 milliLiter(s) (75 mL/Hr) IV Continuous <Continuous>  thiamine 100 milliGRAM(s) Oral daily        VITALS:  T(F): 97.6 (20 @ 05:17), Max: 97.6 (20 @ 05:17)  HR: 91 (20 @ 05:17)  BP: 95/55 (20 @ 05:17)  RR: 20 (20 @ 05:17)  SpO2: 97% (20 @ 05:17)  Wt(kg): --     @ 07:  -   @ 07:00  --------------------------------------------------------  IN: 850 mL / OUT: 500 mL / NET: 350 mL     @ 07:01  -   @ 07:00  --------------------------------------------------------  IN: 900 mL / OUT: 475 mL / NET: 425 mL          PHYSICAL EXAM:  Constitutional: confused lethargic   HEENT: anicteric sclera, oropharynx clear, MMM  Neck: No JVD  Respiratory: CTAB, no wheezes, rales or rhonchi  Cardiovascular: S1, S2, RRR  Gastrointestinal: BS+, soft, NT/ND  Extremities: No cyanosis or clubbing. No peripheral edema  Skin: No rashes    LABS:          159<H>  |  119<H>  |  103<HH>  ----------------------------<  140<H>  4.6   |  21  |  1.5  Creatinine Trend: 1.5<--, 1.9<--, 2.1<--, 1.7<--, 2.8<--  SODIUM TREND:  Sodium 159 [ @ 18:18]  Sodium 165 [ @ 07:31]  Sodium 169 [ @ 23:30]  Sodium 171 [ @ 21:54]  Sodium 174 [ @ 15:00]    Ca    8.4<L>      2020 18:18  Phos  3.0       Mg     2.9         TPro  5.6<L>  /  Alb  2.9<L>  /  TBili  1.1  /  DBili      /  AST  144<H>  /  ALT  95<H>  /  AlkPhos  83                            14.1   13.86 )-----------( 97       ( 2020 18:18 )             45.6       Urine Studies:  Urinalysis Basic - ( 2020 20:00 )    Color: Yellow / Appearance: Clear / S.023 / pH:   Gluc:  / Ketone: Negative  / Bili: Negative / Urobili: <2 mg/dL   Blood:  / Protein: 100 mg/dL / Nitrite: Negative   Leuk Esterase: Negative / RBC: 2 /HPF / WBC 3 /HPF   Sq Epi:  / Non Sq Epi: 6 /HPF / Bacteria: Negative      Creatinine, Random Urine: 95 mg/dL ( @ 20:00)  Sodium, Random Urine: 25.0 mmoL/L ( @ 20:00)  Osmolality, Random Urine: 608 mos/kg ( @ 20:00)    RADIOLOGY & ADDITIONAL STUDIES:

## 2020-04-27 NOTE — PROGRESS NOTE ADULT - ASSESSMENT
A 89-year-old female with a PMH of HTN, type II DM, DLD, CKD stage III, and cognitive impairment who presented with generalized weakness for the past one week and decreased oral intake.    1. Generalized weakness 2/2 infection vs dehydration   - Pt is severely dehydrated   - unknown is there is source of infection  - Sacral ulcer seen by burn, not infected  -no phlebitis seen  -wbc rising, ID consult  -will do ct abd and pelvic with contrast, Cr improved, continue hydration  -start cefapime and flagyl   -COVID neg  - sepsis present on admission  - CXR: no evidence of acute cardio-pulmonary disease  - U/A: no leukocytes, no nitrites      2. BROOKS on CKD stage III with HAGMA (improved)   - likely pre-renal etiology vs. ATN  - last Cr was 1.1; was 2.8 on admission  - FENa is 0.4% (pre-renal)  - nephrology consult appreciated, continue IVF  0.45 Nacl    3. Hyperosm Hypernatremia (resolving)  - likely due to dehydration  - corrected sodium level is 178-->152  -continie 0.45 NaCl    #right facial droop  unlikley new as family said she stopped speaking 3 months ago as per house staff, possible cva   CTH cerbral atrophy  will rescan today in event new     #nutrition  -will place NGT for nutrition   -not cleared by SS      4. Elevated troponin  - likely demand ischemia  -trending down    5. Elevated liver enzymes  - DDx includes sepsis   - check THAD, AMA, ASMA, iron studies, ceruloplasmin, anti-LKM1, hepatitis panel,   -improving     6. HTN  - hold Atenolol as patient's BP currently low-normal; resume when BP stable    7. Type II DM  - hold Metformin  - monitor FS and start insulin if FS persistently >180    8. Cognitive impairment likely dementia  - continue supportive measures per nursing staff    9. DLD  - hold statin due to elevated LFTs    10. DVT prophylaxis  - heparin SQ BID    11. GI prophylaxis  - PPI daily    #Progress Note Handoff  Pending (specify):  follow up CT scans, ID consult, NGT placement  Family discussion: dw family agreed to plan above  Disposition: Home___/SNF___/Other___/Unknown at this time_x_  Poor prognosis  follow up palliative care  Pt seen during COVID 19 Pandemic.

## 2020-04-27 NOTE — PROGRESS NOTE ADULT - PROVIDER SPECIALTY LIST ADULT
Anesthesia Evaluation     .             ROS/MED HX    ENT/Pulmonary:  - neg pulmonary ROS     Neurologic:     (+)Developmental delay  level of function: very interactive and understand.  other neuro autism, quit and reserved. Only trust mom    Cardiovascular:  - neg cardiovascular ROS       METS/Exercise Tolerance:     Hematologic:  - neg hematologic  ROS       Musculoskeletal:  - neg musculoskeletal ROS       GI/Hepatic:  - neg GI/hepatic ROS       Renal/Genitourinary:  - ROS Renal section negative       Endo:  - neg endo ROS       Psychiatric:  - neg psychiatric ROS       Infectious Disease:         Malignancy:         Other:                     Physical Exam  Normal systems: cardiovascular, pulmonary and dental    Airway   Mallampati: I  TM distance: >3 FB  Neck ROM: full    Dental     Cardiovascular   Rhythm and rate: regular and normal      Pulmonary    breath sounds clear to auscultation                    Anesthesia Plan      History & Physical Review  History and physical reviewed and following examination; no interval change.    ASA Status:  2 .    NPO Status:  > 8 hours    Plan for General with Inhalation induction. Maintenance will be Inhalation.    PONV prophylaxis:  Ondansetron (or other 5HT-3) and Dexamethasone or Solumedrol       Postoperative Care  Postoperative pain management:  IV analgesics.      Consents  Anesthetic plan, risks, benefits and alternatives discussed with: .  Use of blood products discussed: No .   .                         .   Internal Medicine

## 2020-04-27 NOTE — PROGRESS NOTE ADULT - SUBJECTIVE AND OBJECTIVE BOX
ANDER TRACEY 89y Female  MRN#: [id number]   CODE STATUS: FULL  Do Not Resuscitate      SUBJECTIVE  Patient is a 89y old Female who presents with a chief complaint of Generalized weakness (2020 15:30)    Currently admitted to medicine with the primary diagnosis of Dyspnea     Today is hospital day 2d, and this morning she is laying in bed comfortably and reports no overnight events. Pt minimally responsive to gentle stimuli, but does grimace when palpating abdomen or drawing blood. Family states she has been this way for past few months. Right facial droop noted, unclear from family if it was there prior.    OBJECTIVE  PAST MEDICAL & SURGICAL HISTORY  High cholesterol  Diabetes  Advanced dementia  S/P cataract surgery    ALLERGIES:  No Known Allergies    MEDICATIONS:  STANDING MEDICATIONS  cefepime   IVPB      chlorhexidine 4% Liquid 1 Application(s) Topical <User Schedule>  heparin   Injectable 5000 Unit(s) SubCutaneous every 12 hours  metroNIDAZOLE  IVPB 500 milliGRAM(s) IV Intermittent once  metroNIDAZOLE  IVPB      multivitamin  Chewable 1 Tablet(s) Oral daily  pantoprazole   Suspension 40 milliGRAM(s) Enteral Tube <User Schedule>  sodium chloride 0.45%. 1000 milliLiter(s) (50 mL/Hr) IV Continuous <Continuous>    PRN MEDICATIONS  acetaminophen  Suppository .. 650 milliGRAM(s) Rectal every 6 hours PRN      VITAL SIGNS: Last 24 Hours  T(C): 36.4 (2020 14:00), Max: 36.4 (2020 05:17)  T(F): 97.5 (2020 14:00), Max: 97.6 (2020 05:17)  HR: 87 (2020 14:00) (87 - 91)  BP: 108/55 (2020 14:00) (95/55 - 108/55)  BP(mean): --  RR: 19 (2020 14:00) (19 - 20)  SpO2: 98% (2020 11:12) (97% - 98%)    LABS:                        11.6   16.85 )-----------( 85       ( 2020 13:50 )             38.9         152<H>  |  117<H>  |  80<HH>  ----------------------------<  159<H>  3.6   |  20  |  1.1    Ca    7.8<L>      2020 13:50  Phos  2.9       Mg     2.4         TPro  5.1<L>  /  Alb  2.5<L>  /  TBili  0.7  /  DBili  x   /  AST  113<H>  /  ALT  75<H>  /  AlkPhos  97        Urinalysis Basic - ( 2020 20:00 )    Color: Yellow / Appearance: Clear / S.023 / pH: x  Gluc: x / Ketone: Negative  / Bili: Negative / Urobili: <2 mg/dL   Blood: x / Protein: 100 mg/dL / Nitrite: Negative   Leuk Esterase: Negative / RBC: 2 /HPF / WBC 3 /HPF   Sq Epi: x / Non Sq Epi: 6 /HPF / Bacteria: Negative      ABG - ( 2020 00:26 )  pH, Arterial: 7.49  pH, Blood: x     /  pCO2: 33    /  pO2: 92    / HCO3: 26    / Base Excess: 2.4   /  SaO2: 98          Culture - Urine (collected 2020 20:00)  Source: .Urine Catheterized  Final Report (2020 10:09):    No growth    Culture - Blood (collected 2020 16:10)  Source: .Blood Blood  Preliminary Report (2020 01:02):    No growth to date.    Culture - Blood (collected 2020 16:10)  Source: .Blood Blood  Preliminary Report (2020 01:02):    No growth to date.      CARDIAC MARKERS ( 2020 07:31 )  x     / 0.08 ng/mL / 1184 U/L / x     / 10.8 ng/mL      RADIOLOGY:    < from: Xray Chest 1 View- PORTABLE-Urgent (20 @ 17:40) >  IMPRESSION:      1. Enteric tube coiled in the stomach.  2. New bilateral opacities, right worse than left, and small right pleural effusion.    < end of copied text >      < from: CT Head No Cont (20 @ 16:26) >  Impression  Cerebral atrophy.    < end of copied text >      PHYSICAL EXAM:    GENERAL: NAD, well-developed, minimally responsive to gentle stimuli  HEENT:  Atraumatic, Normocephalic.  No JVD  PULMONARY: decreased breath sounds bilaterally, limited to frontal auscultation  CARDIOVASCULAR: Regular rate and rhythm; No murmurs, rubs, or gallops  GASTROINTESTINAL: Soft, tender to palpation?, Nondistended; Bowel sounds present  MUSCULOSKELETAL:  no cyanosis. mild bilateral UE edema  NEUROLOGY: right facial droop noted, unable to assess further neuro function  SKIN: No rashes or lesions      ASSESSMENT & PLAN    A 89-year-old female with a PMH of HTN, type II DM, DLD, CKD stage III, and cognitive impairment who presented with generalized weakness for the past one week and decreased oral intake.    # Generalized weakness due to infection? vs dehydration/hypernatremia  - Pt is dehydrated on exam and labs  - wbc rising with unclear source of infection  - sacral ulcer seen by burn, not infected  - no phlebitis present  - CXR: no evidence of acute cardio-pulmonary disease  - U/A: no leukocytes, no nitrites  - blood cx NGTD x2  - COVID neg  - ct abd/pelvis with contrast ordered, Cr improved, continue hydration  - cefapime and flagyl  started  - f/u ID consult    # BROOKS on CKD stage III with HAGMA (improved)- likely pre-renal etiology vs. ATN  - last Cr was 1.1; was 2.8 on admission  - FENa is 0.4% (pre-renal)  - nephrology consult appreciated, continue IVF 0.45 Nacl    # Hyperosmolar Hypernatremia- resolving  -likely due to dehydration  -corrected sodium level is 178-->152  - continue 1/2 NS for now    # Right facial droop  - unlikley new as family said she stopped speaking and overall function declined ~ 3 months ago with unclear etiology as per daughter and grandson   - CTH on admission with just cerebral atrophy  - repeat CTH ordered; f/u results    # Nutrition  - NGT placed today; f/u cxr   - not cleared by SS for PO diet    # Elevated troponin  - likely demand ischemia  - trending down; 0.14-->0.08    # Elevated liver enzymes -improving   - DDx includes sepsis   - THAD, AMA, ASMA, iron studies, ceruloplasmin, anti-LKM1, hepatitis panel pending    # HTN  - holding Atenolol as patient's BP currently low-normal  - consider resuming when BP stable    # Type II DM  - hold Metformin  - monitor FS and start insulin if FS persistently >180    # Cognitive impairment likely dementia  - continue supportive measures per nursing staff    # DLD  - holding statin due to elevated LFTs    # DVT prophylaxis: heparin SC  #GI prophylaxis: protonix  # Diet: NGT feeds  # Code status: DNR/DNI

## 2020-04-27 NOTE — PHYSICAL THERAPY INITIAL EVALUATION ADULT - SPECIFY REASON(S)
per RN Fredy, patient is too lethargic at this time, not able to participate in activity. will follow.

## 2020-04-27 NOTE — PROGRESS NOTE ADULT - SUBJECTIVE AND OBJECTIVE BOX
History of Present Illness:  Reason for Admission: Generalized weakness	  History of Present Illness: 	  The patient is a 89-year-old female with a PMH of HTN, type II DM, DLD, CKD stage III, and cognitive impairment who presented with generalized weakness for the past one week.  History was obtained from the patient's family members over the telephone due to the patient's mental status.  She has had decreased oral intake over the past 3-4 days.  At her baseline, the patient is non-verbal and ambulates with assistance.  However over the past one week she's been mostly bedbound.  The patient had an episode of vomiting today.  She also appeared to have labored breathing per family today.  Otherwise, the patient reportedly did not have fever, chills, abdominal pain, bowel or urinary complaints.  The patient's family denied any recent sick contacts.     Review of Systems:  Other Review of Systems: All other review of systems negative, except as noted in HPI	      Allergies and Intolerances:        Allergies:  	No Known Allergies:     Home Medications:   * Patient Currently Takes Medications as of 25-Apr-2020 20:01 documented in Structured Notes  · 	metFORMIN 500 mg oral tablet: Last Dose Taken:  , 1 tab(s) orally 2 times a day  · 	atenolol 50 mg oral tablet: Last Dose Taken:  , 1 tab(s) orally once a day  · 	atorvastatin 20 mg oral tablet: Last Dose Taken:  , 1 tab(s) orally once a day (at bedtime)    Patient History:    Past Medical, Past Surgical, and Family History:  PAST MEDICAL HISTORY:  Advanced dementia     Diabetes     High cholesterol.     PAST SURGICAL HISTORY:  S/P cataract surgery.    Vital Signs Last 24 Hrs  T(C): 36.4 (27 Apr 2020 14:00), Max: 36.4 (27 Apr 2020 05:17)  T(F): 97.5 (27 Apr 2020 14:00), Max: 97.6 (27 Apr 2020 05:17)  HR: 87 (27 Apr 2020 14:00) (87 - 92)  BP: 108/55 (27 Apr 2020 14:00) (95/55 - 117/57)  BP(mean): --  RR: 19 (27 Apr 2020 14:00) (18 - 20)  SpO2: 98% (27 Apr 2020 11:12) (97% - 98%) on O2 2LPM via NC      LABS:  04-26 @ 07:31 Creatine 1184 U/L<H> [0 - 225]                          11.6   16.85 )-----------( 85       ( 27 Apr 2020 13:50 )             38.9     04-27    152<H>  |  117<H>  |  80<HH>  ----------------------------<  159<H>  3.6   |  20  |  1.1    Ca    7.8<L>      27 Apr 2020 13:50  Phos  2.9     04-27  Mg     2.4     04-27    TPro  5.1<L>  /  Alb  2.5<L>  /  TBili  0.7  /  DBili  x   /  AST  113<H>  /  ALT  75<H>  /  AlkPhos  97  04-27    FROM 4/25/2020:  CRP = 0.85  PROCALCITONIN = 0.23  FERRITIN = 676  D-DIMER = 4391    < from: CT Head No Cont (04.25.20 @ 16:26) >  PROCEDURE DATE:  04/25/2020            INTERPRETATION:  Clinical History / Reason for exam: Altered mental status  Study was performed without IV contrast.  Comparison 12/4/2016  The cisterns and ventricles are normal with no shift in midline structures.  No hemorrhage, infarct or mass formation is seen.  There is generalized cerebral atrophy.  The skull is intact.  Impression  Cerebral atrophy.    < end of copied text >  < from: Xray Chest 1 View-PORTABLE IMMEDIATE (04.25.20 @ 15:51) >  PROCEDURE DATE:  04/25/2020            INTERPRETATION:  Clinical History / Reason for exam: Altered mental status    Comparison : Chest radiograph December 4, 2016.    Technique/Positioning: AP.    Findings:    Support devices: None.    Cardiac/mediastinum/hilum: Unremarkable.    Lung parenchyma/Pleura: Within normal limits.    Skeleton/soft tissues: Stable.    Impression:      No radiographic evidence of acute cardiopulmonary disease.    < end of copied text >      PE the patient is sleeping comfortably, RR = 24/reg, pulse ox = 94-95%  Skin is dry  Lungs are clear  Heart RR  Abdomen soft, bs+, non-tender  Extremities no CCE    Assessment and Plan:    Assessment:  · Assessment		  A 89-year-old female with a PMH of HTN, type II DM, DLD, CKD stage III, and cognitive impairment who presented with generalized weakness for the past one week and decreased oral intake.    1. Generalized weakness  - DDx includes infection (r/o COVID-19) vs. BROOKS vs. hypernatremia--COVID 19 was NEGATIVE on 4/25/2020  - sepsis present on admission  - CXR: no evidence of acute cardio-pulmonary disease  - U/A: no leukocytes, no nitrites  - no obvious source of infection at present;  f/u COVID-19 PCR and blood culture results    2. BROOKS on CKD stage III with HAGMA  - likely pre-renal etiology vs. ATN  - last Cr was 1.1; was 2.8 on admission, creat = 1.5 today but BUN is still 103  - FENa is 0.4% (pre-renal)  - start D5W at 75 cc/hr; monitor FS  - nephrology consult was done----> continue IV fluids and monitor labs daily    3. Hypernatremia  - likely due to dehydration  - Na+ today = 159, improving slowly with IV fluids  - was getting D5W at 75 cc/hr; monitor FS, now getting 0.45NS @ 75cc/hr    4. Elevated troponin  - likely demand ischemia  - EKG: sinus rhythm with TWI in leads V1-V3 (TWI seen on leads V1-V2 from last EKG from 9/2018)  - pro-BNP: 5132  - check repeat cardiac enzymes    5. Elevated liver enzymes  - DDx includes sepsis   - check THAD, AMA, ASMA, iron studies, ceruloplasmin, anti-LKM1, hepatitis panel,     6. HTN  - hold Atenolol as patient's BP currently low-normal; resume when BP stable    7. Type II DM  - hold Metformin  - monitor FS and start insulin if FS persistently >180    8. Cognitive impairment likely dementia  - continue supportive measures per nursing staff  - follow up repeat head CT ordered today to r/o new CVA    9. DLD  - hold statin due to elevated LFTs    10. DVT prophylaxis  - heparin SQ BID    11. GI prophylaxis  - PPI daily    12. Disposition  - anticipate discharge home vs. SNF once medically stable  - overall prognosis is poor; consider palliative care evaluation---will speak to family first  - NPO until speech/swallow evaluation----patient was evaluated by SLP, keep NPO due to severe cognitive deficits, she cannot eat/swallow; will need to discuss options  with patient's family  - DNR/DNI    Will discuss with Attending History of Present Illness:  Reason for Admission: Generalized weakness	  History of Present Illness: 	  The patient is a 89-year-old female with a PMH of HTN, type II DM, DLD, CKD stage III, and cognitive impairment who presented with generalized weakness for the past one week.  History was obtained from the patient's family members over the telephone due to the patient's mental status.  She has had decreased oral intake over the past 3-4 days.  At her baseline, the patient is non-verbal and ambulates with assistance.  However over the past one week she's been mostly bedbound.  The patient had an episode of vomiting today.  She also appeared to have labored breathing per family today.  Otherwise, the patient reportedly did not have fever, chills, abdominal pain, bowel or urinary complaints.  The patient's family denied any recent sick contacts.     Review of Systems:  Other Review of Systems: All other review of systems negative, except as noted in HPI	      Allergies and Intolerances:        Allergies:  	No Known Allergies:     Home Medications:   * Patient Currently Takes Medications as of 25-Apr-2020 20:01 documented in Structured Notes  · 	metFORMIN 500 mg oral tablet: Last Dose Taken:  , 1 tab(s) orally 2 times a day  · 	atenolol 50 mg oral tablet: Last Dose Taken:  , 1 tab(s) orally once a day  · 	atorvastatin 20 mg oral tablet: Last Dose Taken:  , 1 tab(s) orally once a day (at bedtime)    Patient History:    Past Medical, Past Surgical, and Family History:  PAST MEDICAL HISTORY:  Advanced dementia     Diabetes     High cholesterol.     PAST SURGICAL HISTORY:  S/P cataract surgery.    Vital Signs Last 24 Hrs  T(C): 36.4 (27 Apr 2020 14:00), Max: 36.4 (27 Apr 2020 05:17)  T(F): 97.5 (27 Apr 2020 14:00), Max: 97.6 (27 Apr 2020 05:17)  HR: 87 (27 Apr 2020 14:00) (87 - 92)  BP: 108/55 (27 Apr 2020 14:00) (95/55 - 117/57)  BP(mean): --  RR: 19 (27 Apr 2020 14:00) (18 - 20)  SpO2: 98% (27 Apr 2020 11:12) (97% - 98%) on O2 2LPM via NC      LABS:  04-26 @ 07:31 Creatine 1184 U/L<H> [0 - 225]                          11.6   16.85 )-----------( 85       ( 27 Apr 2020 13:50 )             38.9     04-27    152<H>  |  117<H>  |  80<HH>  ----------------------------<  159<H>  3.6   |  20  |  1.1    Ca    7.8<L>      27 Apr 2020 13:50  Phos  2.9     04-27  Mg     2.4     04-27    TPro  5.1<L>  /  Alb  2.5<L>  /  TBili  0.7  /  DBili  x   /  AST  113<H>  /  ALT  75<H>  /  AlkPhos  97  04-27    FROM 4/25/2020:  CRP = 0.85  PROCALCITONIN = 0.23  FERRITIN = 676  D-DIMER = 4391    < from: CT Head No Cont (04.25.20 @ 16:26) >  PROCEDURE DATE:  04/25/2020            INTERPRETATION:  Clinical History / Reason for exam: Altered mental status  Study was performed without IV contrast.  Comparison 12/4/2016  The cisterns and ventricles are normal with no shift in midline structures.  No hemorrhage, infarct or mass formation is seen.  There is generalized cerebral atrophy.  The skull is intact.  Impression  Cerebral atrophy.    < end of copied text >  < from: Xray Chest 1 View-PORTABLE IMMEDIATE (04.25.20 @ 15:51) >  PROCEDURE DATE:  04/25/2020            INTERPRETATION:  Clinical History / Reason for exam: Altered mental status    Comparison : Chest radiograph December 4, 2016.    Technique/Positioning: AP.    Findings:    Support devices: None.    Cardiac/mediastinum/hilum: Unremarkable.    Lung parenchyma/Pleura: Within normal limits.    Skeleton/soft tissues: Stable.    Impression:      No radiographic evidence of acute cardiopulmonary disease.    < end of copied text >      PE the patient is sleeping comfortably, RR = 24/reg, pulse ox = 94-95%  Skin is dry  Lungs are clear  Heart RR  Abdomen soft, bs+, non-tender  Extremities no CCE    Assessment and Plan:    Assessment:  · Assessment		  A 89-year-old female with a PMH of HTN, type II DM, DLD, CKD stage III, and cognitive impairment who presented with generalized weakness for the past one week and decreased oral intake.    1. Generalized weakness  - DDx includes infection (r/o COVID-19) vs. BROOKS vs. hypernatremia--COVID 19 was NEGATIVE on 4/25/2020  - sepsis present on admission  - CXR: no evidence of acute cardio-pulmonary disease  - U/A: no leukocytes, no nitrites  - no obvious source of infection at present; f/up blood culture results (COVID was neg x2)  - CT abdomen/pelvis with iv contrast was ordered by hospitalist today to r/o abscess    2. BROOKS on CKD stage III with HAGMA  - likely pre-renal etiology vs. ATN  - last Cr was 1.1; was 2.8 on admission, creat = 1.5 today but BUN is still 103  - FENa is 0.4% (pre-renal)  - start D5W at 75 cc/hr; monitor FS  - nephrology consult was done----> continue IV fluids and monitor labs daily    3. Hypernatremia  - likely due to dehydration  - Na+ today = 159, improving slowly with IV fluids  - was getting D5W at 75 cc/hr; monitor FS, now getting 0.45NS @ 75cc/hr    4. Elevated troponin  - likely demand ischemia  - EKG: sinus rhythm with TWI in leads V1-V3 (TWI seen on leads V1-V2 from last EKG from 9/2018)  - pro-BNP: 5132  - check repeat cardiac enzymes    5. Elevated liver enzymes  - DDx includes sepsis   - check THAD, AMA, ASMA, iron studies, ceruloplasmin, anti-LKM1, hepatitis panel,     6. HTN  - hold Atenolol as patient's BP currently low-normal; resume when BP stable    7. Type II DM  - hold Metformin  - monitor FS and start insulin if FS persistently >180    8. Cognitive impairment likely dementia  - continue supportive measures per nursing staff  - follow up repeat head CT ordered today to r/o new CVA    9. DLD  - hold statin due to elevated LFTs    10. DVT prophylaxis  - heparin SQ BID    11. GI prophylaxis  - PPI daily    12. Disposition  - anticipate discharge home vs. SNF once medically stable  - overall prognosis is poor; consider palliative care evaluation---will speak to family first  - NPO until speech/swallow evaluation----patient was evaluated by SLP, keep NPO due to severe cognitive deficits, she cannot eat/swallow; will need to discuss options  with patient's family----> medical resident just placed NG tube for feeding at 16:00; he said the patient is DNR/DNI but they want everything else done for her, including feeding her, so will order Glucerna or Jevity via the NGT once placement is confirmed; CXR was ordered  - DNR/DNI    Will discuss with Attending History of Present Illness:  Reason for Admission: Generalized weakness	  History of Present Illness: 	  The patient is a 89-year-old female with a PMH of HTN, type II DM, DLD, CKD stage III, and cognitive impairment who presented with generalized weakness for the past one week.  History was obtained from the patient's family members over the telephone due to the patient's mental status.  She has had decreased oral intake over the past 3-4 days.  At her baseline, the patient is non-verbal and ambulates with assistance.  However over the past one week she's been mostly bedbound.  The patient had an episode of vomiting today.  She also appeared to have labored breathing per family today.  Otherwise, the patient reportedly did not have fever, chills, abdominal pain, bowel or urinary complaints.  The patient's family denied any recent sick contacts.     Review of Systems:  Other Review of Systems: All other review of systems negative, except as noted in HPI	      Allergies and Intolerances:        Allergies:  	No Known Allergies:     Home Medications:   * Patient Currently Takes Medications as of 25-Apr-2020 20:01 documented in Structured Notes  · 	metFORMIN 500 mg oral tablet: Last Dose Taken:  , 1 tab(s) orally 2 times a day  · 	atenolol 50 mg oral tablet: Last Dose Taken:  , 1 tab(s) orally once a day  · 	atorvastatin 20 mg oral tablet: Last Dose Taken:  , 1 tab(s) orally once a day (at bedtime)    Patient History:    Past Medical, Past Surgical, and Family History:  PAST MEDICAL HISTORY:  Advanced dementia     Diabetes     High cholesterol.     PAST SURGICAL HISTORY:  S/P cataract surgery.    Vital Signs Last 24 Hrs  T(C): 36.4 (27 Apr 2020 14:00), Max: 36.4 (27 Apr 2020 05:17)  T(F): 97.5 (27 Apr 2020 14:00), Max: 97.6 (27 Apr 2020 05:17)  HR: 87 (27 Apr 2020 14:00) (87 - 92)  BP: 108/55 (27 Apr 2020 14:00) (95/55 - 117/57)  BP(mean): --  RR: 19 (27 Apr 2020 14:00) (18 - 20)  SpO2: 98% (27 Apr 2020 11:12) (97% - 98%) on O2 2LPM via NC      LABS:  04-26 @ 07:31 Creatine 1184 U/L<H> [0 - 225]                          11.6   16.85 )-----------( 85       ( 27 Apr 2020 13:50 )             38.9     04-27    152<H>  |  117<H>  |  80<HH>  ----------------------------<  159<H>  3.6   |  20  |  1.1    Ca    7.8<L>      27 Apr 2020 13:50  Phos  2.9     04-27  Mg     2.4     04-27    TPro  5.1<L>  /  Alb  2.5<L>  /  TBili  0.7  /  DBili  x   /  AST  113<H>  /  ALT  75<H>  /  AlkPhos  97  04-27    FROM 4/25/2020:  CRP = 0.85  PROCALCITONIN = 0.23  FERRITIN = 676  D-DIMER = 4391    < from: CT Head No Cont (04.25.20 @ 16:26) >  PROCEDURE DATE:  04/25/2020            INTERPRETATION:  Clinical History / Reason for exam: Altered mental status  Study was performed without IV contrast.  Comparison 12/4/2016  The cisterns and ventricles are normal with no shift in midline structures.  No hemorrhage, infarct or mass formation is seen.  There is generalized cerebral atrophy.  The skull is intact.  Impression  Cerebral atrophy.    < end of copied text >  < from: Xray Chest 1 View-PORTABLE IMMEDIATE (04.25.20 @ 15:51) >  PROCEDURE DATE:  04/25/2020            INTERPRETATION:  Clinical History / Reason for exam: Altered mental status    Comparison : Chest radiograph December 4, 2016.    Technique/Positioning: AP.    Findings:    Support devices: None.    Cardiac/mediastinum/hilum: Unremarkable.    Lung parenchyma/Pleura: Within normal limits.    Skeleton/soft tissues: Stable.    Impression:      No radiographic evidence of acute cardiopulmonary disease.    < end of copied text >      PE the patient is sleeping comfortably, RR = 24/reg, pulse ox = 94-95%  Skin is dry  Lungs are clear  Heart RR  Abdomen soft, bs+, non-tender  Extremities no CCE    Assessment and Plan:    Assessment:  · Assessment		  A 89-year-old female with a PMH of HTN, type II DM, DLD, CKD stage III, and cognitive impairment who presented with generalized weakness for the past one week and decreased oral intake.    1. Generalized weakness  - DDx includes infection (r/o COVID-19) vs. BROOKS vs. hypernatremia--COVID 19 was NEGATIVE on 4/25/2020  - sepsis present on admission  - CXR: no evidence of acute cardio-pulmonary disease  - U/A: no leukocytes, no nitrites  - no obvious source of infection at present; f/up blood culture results (negative x 2 so far from 4/25, urine culture was also negative);  (COVID was neg x2)  - CT abdomen/pelvis with iv contrast was ordered by hospitalist today to r/o abscess    2. BROOKS on CKD stage III with HAGMA  - likely pre-renal etiology vs. ATN  - last Cr was 1.1; was 2.8 on admission, creat = 1.5 today but BUN is still 103  - FENa is 0.4% (pre-renal)  - start D5W at 75 cc/hr; monitor FS  - nephrology consult was done----> continue IV fluids and monitor labs daily    3. Hypernatremia  - likely due to dehydration  - Na+ today = 159, improving slowly with IV fluids  - was getting D5W at 75 cc/hr; monitor FS, now getting 0.45NS @ 75cc/hr    4. Elevated troponin  - likely demand ischemia  - EKG: sinus rhythm with TWI in leads V1-V3 (TWI seen on leads V1-V2 from last EKG from 9/2018)  - pro-BNP: 5132  - check repeat cardiac enzymes    5. Elevated liver enzymes  - DDx includes sepsis   - check THAD, AMA, ASMA, iron studies, ceruloplasmin, anti-LKM1, hepatitis panel,     6. HTN  - hold Atenolol as patient's BP currently low-normal; resume when BP stable    7. Type II DM  - hold Metformin  - monitor FS and start insulin if FS persistently >180    8. Cognitive impairment likely dementia  - continue supportive measures per nursing staff  - follow up repeat head CT ordered today to r/o new CVA    9. DLD  - hold statin due to elevated LFTs    10. DVT prophylaxis  - heparin SQ BID    11. GI prophylaxis  - PPI daily    12. sacral ulcer:    - the patient was seen by Burn---continue local care with soap and water, hydrogel/ DSD twice a day    13. Disposition  - anticipate discharge home vs. SNF once medically stable  - overall prognosis is poor; consider palliative care evaluation---will speak to family first  - NPO until speech/swallow evaluation----patient was evaluated by SLP, keep NPO due to severe cognitive deficits, she cannot eat/swallow; will need to discuss options  with patient's family----> medical resident just placed NG tube for feeding at 16:00; he said the patient is DNR/DNI but they want everything else done for her, including feeding her, so will order Glucerna or Jevity via the NGT once placement is confirmed; CXR was ordered  - DNR/DNI    Will discuss with Attending

## 2020-04-27 NOTE — PROGRESS NOTE ADULT - ASSESSMENT
4y Female PMH of HTN, type II DM, DLD, CKD stage III, and cognitive impairment presented with generalized weakness, decreased oral intake.    # BROOKS / hypernatremia   - BROOKS likely prerenal.    - serum creatinine Na  improving   - non-oliguric   -  cont. 0.45% saline. Na level improving.   - corrected Ca, Mg, phos at goal.   - BP noted. keep MAP > 65   - Hb at goal.   - avoid nephrotoxins and hypotension   - no need for RRT   - will follow

## 2020-04-27 NOTE — DIETITIAN INITIAL EVALUATION ADULT. - ENTERAL
If enteral nutrition warranted initiate Jevity 1.2 @120ml q6h for the first 24-48h and monitor electrolytes closely. If tolerated advance to goal rate of 240ml q6h for 1158kcal, 53g protein, 778ml free H2O (100% est calorie needs, 100% est protein needs).

## 2020-04-28 NOTE — CHART NOTE - NSCHARTNOTEFT_GEN_A_CORE
The patient is a 89-year-old female with a PMH of HTN, type II DM, DLD, CKD stage III, and cognitive impairment due to advanced dementia, who presented with generalized weakness for the past one week.  History was obtained from the patient's family members over the telephone due to the patient's mental status.  She has had decreased oral intake over the past 3-4 days.  At her baseline, the patient is non-verbal and ambulates with assistance.  However over the past one week she's been mostly bedbound.  The patient had an episode of vomiting on day of admission 4/25/2020.  She also appeared to have labored breathing per family.  Otherwise, the patient reportedly did not have fever, chills, abdominal pain, bowel or urinary complaints.  The patient's family denied any recent sick contacts. (25 Apr 2020 18:44). She tested negative for covid on 4/25/2020.    NG tube was placed on 4/27 for feeding since the patient could not eat or swallow due to severe dementia and lethargy.    Today the patient had CT abdomen/pelvis with po and IV contrast to r/o abscess, since the source of leukocytosis wasn't found. However her CXR 4/26 and 4/27 showed  b/l infiltrates R>L and she was started on IV cefepime and flagyl by ID. The CT mentioned that her bladder was distended despite having a bond catheter, and also mentioned  large stool burden in the rectum, and when the patient was uncovered to check the catheter position, the patient was found to be covered in blood. After she was cleaned, the blood  was noted to be coming from the rectum. It is dark maroon with large clots. GI and Surgery were both called, and stat labs were drawn, including CBC, BMP, Mg++, Pt/PTT, and  T & S. SC heparin was stopped. She was made npo. Per Surgery the cause is a GI bleed, no surgical intervention is needed at this time. Per GI, start IV Protonix 40mg q12h (she  was getting the suspension via NGT), and IV NS 500cc bolus was ordered due to hypotension and tachycardia (last BP = 90/42 HR = 106), then change iv fluids to D51/2 NS @75cc/hr  due to resolving hypernatremia. The patient was extremely dehydrated when admitted, BUN/creat. was 117/2.8 and NA+ was 174, last BUN = 69 today and Na+=158.   Two 18 gauge iv lines were placed via ultrasound by the rapid response nursing team. She also spiked a fever of 102.4.    The patient is DNR/DNI, however I spoke to the family today (her grandson and daughter Claire, who makes all her mother's decisions, the patient lives with her) and  Claire wants her mother to have everything done that is necessary, including EGD/colonoscopy and blood transfusions. Verbal consent was obtained over the phone and the patient's nurse  was the witness.    Vital Signs Last 24 Hrs  T(C): 39.1 (28 Apr 2020 17:58), Max: 39.1 (28 Apr 2020 17:58)  T(F): 102.4 (28 Apr 2020 17:58), Max: 102.4 (28 Apr 2020 17:58)  HR: 102 (28 Apr 2020 17:58) (86 - 102)  BP: 104/51 (28 Apr 2020 17:58) (97/55 - 104/51)  BP(mean): --  RR: 16 (28 Apr 2020 17:58) (16 - 97)  SpO2: 98% (28 Apr 2020 17:58) (96% - 98%) on 4LPM via NC    < from: CT Abdomen and Pelvis w/ Oral Cont and w/ IV Cont (04.28.20 @ 11:43) >    IMPRESSION:     1.  Right lower lobe consolidation, compatible with pneumonia.  2.  Distended gallbladder with mild wall edema, nonspecific.  3.  Urinary bladder distended despite presence of Bond catheter.  4.  Nonspecific endometrial enhancement. Recommend further evaluation with nonemergent outpatient pelvic sonogram as clinically warranted.  5.  Large rectal stool burden.    < end of copied text >    < from: Xray Chest 1 View- PORTABLE-Routine (04.28.20 @ 16:28) >    INTERPRETATION:  Clinical History / Reason for exam: Catheter placement.    Comparison : Chest radiograph April 27, 2020; April 25, 2020.    Technique/Positioning: Frontal portable.    Findings:    Support devices: Enteric catheter extends below the hemidiaphragm.    Cardiac/mediastinum/hilum: Aorta is atherosclerotic.    Lung parenchyma/Pleura: Basilar opacities, right greater than left.    Skeleton/soft tissues: Unchanged.    Impression:      Basilar opacifications, right greater than left. Support devices as described.    < end of copied text >      LABS:                          9.5    12.75 )-----------( 89       ( 28 Apr 2020 19:27 )             29.5    (HG/HCT = 10.5/33.6 at 5:30AM today)    04-28    153<H>  |  118<H>  |  59<H>  ----------------------------<  146<H>  4.6   |  19  |  1.1    Ca    8.0<L>      28 Apr 2020 19:27  Phos  2.8     04-28  Mg     2.6     04-28    TPro  4.8<L>  /  Alb  2.2<L>  /  TBili  0.6  /  DBili  x   /  AST  205<H>  /  ALT  92<H>  /  AlkPhos  137<H>  04-28    PT/INR - ( 28 Apr 2020 19:27 )   PT: 24.10 sec;   INR: 2.10 ratio         PTT - ( 28 Apr 2020 19:27 )  PTT:53.1 sec    Discussed with GI fellow, Surgery resident (who saw her today) and Hospitalist Dr. Mitchell. GI will see her tomorrow, or sooner if she decompensates further. Currently she is still passing maroon stool but is comfortable. BP improved to 106/50 and HR is 100.  Monitor closely--VS, I's and O's (patient has a bond catheter), pulse ox, labs q12hr  Family would like to be kept informed of the patient's status.  They also requested that MD call the patient's PCP in the community, Dr. Socorro Harrison [phone]. The patient is a 89-year-old female with a PMH of HTN, type II DM, DLD, CKD stage III, and cognitive impairment due to advanced dementia, who presented with generalized weakness for the past one week.  History was obtained from the patient's family members over the telephone due to the patient's mental status.  She has had decreased oral intake over the past 3-4 days.  At her baseline, the patient is non-verbal and ambulates with assistance.  However over the past one week she's been mostly bedbound.  The patient had an episode of vomiting on day of admission 4/25/2020.  She also appeared to have labored breathing per family.  Otherwise, the patient reportedly did not have fever, chills, abdominal pain, bowel or urinary complaints.  The patient's family denied any recent sick contacts. (25 Apr 2020 18:44). She tested negative for covid on 4/25/2020.    NG tube was placed on 4/27 for feeding since the patient could not eat or swallow due to severe dementia and lethargy.    Today the patient had CT abdomen/pelvis with po and IV contrast to r/o abscess, since the source of leukocytosis wasn't found. However her CXR 4/26 and 4/27 showed  b/l infiltrates R>L and she was started on IV cefepime and flagyl by ID. The CT mentioned that her bladder was distended despite having a bond catheter, and also mentioned  large stool burden in the rectum, and when the patient was uncovered to check the catheter position, the patient was found to be covered in blood. After she was cleaned, the blood  was noted to be coming from the rectum. It is dark maroon with large clots. GI and Surgery were both called, and stat labs were drawn, including CBC, BMP, Mg++, Pt/PTT, and  T & S. SC heparin was stopped. She was made npo. Per Surgery the cause is a GI bleed, no surgical intervention is needed at this time. Per GI, start IV Protonix 40mg q12h (she  was getting the suspension via NGT), and IV NS 500cc bolus was ordered due to hypotension and tachycardia (last BP = 90/42 HR = 106), then change iv fluids to D51/2 NS @75cc/hr  due to resolving hypernatremia. The patient was extremely dehydrated when admitted, BUN/creat. was 117/2.8 and NA+ was 174, last BUN = 69 today and Na+=158.   Two 18 gauge iv lines were placed via ultrasound by the rapid response nursing team. She also spiked a fever of 102.4.    The patient is DNR/DNI, however I spoke to the family today (her grandson and daughter Claire, who makes all her mother's decisions, the patient lives with her) and  Claire wants her mother to have everything done that is necessary, including EGD/colonoscopy and blood transfusions. Verbal consent was obtained over the phone and the patient's nurse  was the witness.    Vital Signs Last 24 Hrs  T(C): 39.1 (28 Apr 2020 17:58), Max: 39.1 (28 Apr 2020 17:58)  T(F): 102.4 (28 Apr 2020 17:58), Max: 102.4 (28 Apr 2020 17:58)  HR: 102 (28 Apr 2020 17:58) (86 - 102)  BP: 104/51 (28 Apr 2020 17:58) (97/55 - 104/51)  BP(mean): --  RR: 16 (28 Apr 2020 17:58) (16 - 97)  SpO2: 98% (28 Apr 2020 17:58) (96% - 98%) on 4LPM via NC    < from: CT Abdomen and Pelvis w/ Oral Cont and w/ IV Cont (04.28.20 @ 11:43) >    IMPRESSION:     1.  Right lower lobe consolidation, compatible with pneumonia.  2.  Distended gallbladder with mild wall edema, nonspecific.  3.  Urinary bladder distended despite presence of Bond catheter.  4.  Nonspecific endometrial enhancement. Recommend further evaluation with nonemergent outpatient pelvic sonogram as clinically warranted.  5.  Large rectal stool burden.    < end of copied text >    < from: Xray Chest 1 View- PORTABLE-Routine (04.28.20 @ 16:28) >    INTERPRETATION:  Clinical History / Reason for exam: Catheter placement.    Comparison : Chest radiograph April 27, 2020; April 25, 2020.    Technique/Positioning: Frontal portable.    Findings:    Support devices: Enteric catheter extends below the hemidiaphragm.    Cardiac/mediastinum/hilum: Aorta is atherosclerotic.    Lung parenchyma/Pleura: Basilar opacities, right greater than left.    Skeleton/soft tissues: Unchanged.    Impression:      Basilar opacifications, right greater than left. Support devices as described.    < end of copied text >      LABS:                          9.5    12.75 )-----------( 89       ( 28 Apr 2020 19:27 )             29.5    (HG/HCT = 10.5/33.6 at 5:30AM today)    04-28    153<H>  |  118<H>  |  59<H>  ----------------------------<  146<H>  4.6   |  19  |  1.1    Ca    8.0<L>      28 Apr 2020 19:27  Phos  2.8     04-28  Mg     2.6     04-28    TPro  4.8<L>  /  Alb  2.2<L>  /  TBili  0.6  /  DBili  x   /  AST  205<H>  /  ALT  92<H>  /  AlkPhos  137<H>  04-28    PT/INR - ( 28 Apr 2020 19:27 )   PT: 24.10 sec;   INR: 2.10 ratio         PTT - ( 28 Apr 2020 19:27 )  PTT:53.1 sec    Discussed with GI fellow, Surgery resident (who saw her today) and Hospitalist Dr. Mitchell. GI will see her tomorrow, or sooner if she decompensates further. Currently she is still passing maroon stool but is comfortable. BP improved to 106/50 and HR is 100.  Monitor closely--VS, I's and O's (patient has a bond catheter), pulse ox, labs [id number] LFT's and PT/PTT in AM also (all are elevated); blood cultures; urine for Strep  pneumonia and Legionella also ordered as per ID  Family would like to be kept informed of the patient's status.  They also requested that MD call the patient's PCP in the community, Dr. Socorro Harrison [phone].

## 2020-04-28 NOTE — CONSULT NOTE ADULT - ASSESSMENT
89-year-old female with a PMH of HTN, type II DM, DLD, CKD stage III, and cognitive impairment admitted for workup and treatment 2/2 gram negative pneumonia, BROOKS, and electrolyte abnormalities. Surgery consulted for new finding of melena.     Plan:   -Consult GI for colonoscopy to evaluate etiology of apparent lower GI bleed  -Trend CBC, transfuse PRN   -Monitor vitals   -No surgical intervention indicated at this time   -Recall PRN

## 2020-04-28 NOTE — SWALLOW BEDSIDE ASSESSMENT ADULT - SWALLOW EVAL: DIAGNOSIS
Severe/profound oral dysphagia with puree 2/2 bolus holding, and unawareness of bolus in oral cavity. Pt. with variable mental status and therefore not appropriate for further PO trials at this time 2/2 high aspiration risk.
Severe/profound oral dysphagia with puree 2/2 bolus holding, and unawareness of bolus in oral cavity. Pt. with variable mental status and therefore not appropriate for further PO trials at this time 2/2 high aspiration risk.
Pt is not a candidate for PO trials at this time 2' decreased arousal level and lethargy, therefore a high risk for aspiration at this time.

## 2020-04-28 NOTE — PROGRESS NOTE ADULT - SUBJECTIVE AND OBJECTIVE BOX
Pt seen and examined at bedside. unable to get ros. Pt stupeorous    PAST MEDICAL & SURGICAL HISTORY:  High cholesterol  Diabetes  Advanced dementia  S/P cataract surgery      VITAL SIGNS (Last 24 hrs):  T(C): 36.4 (04-28-20 @ 08:00), Max: 37.6 (04-28-20 @ 00:17)  HR: 86 (04-28-20 @ 08:00) (86 - 94)  BP: 97/55 (04-28-20 @ 08:00) (97/55 - 108/55)  RR: 97 (04-28-20 @ 12:48) (18 - 97)  SpO2: 96% (04-28-20 @ 00:17) (96% - 96%)  Wt(kg): --  Daily     Daily     I&O's Summary    27 Apr 2020 07:01  -  28 Apr 2020 07:00  --------------------------------------------------------  IN: 0 mL / OUT: 1002 mL / NET: -1002 mL    28 Apr 2020 07:01  -  28 Apr 2020 13:48  --------------------------------------------------------  IN: 0 mL / OUT: 300 mL / NET: -300 mL        PHYSICAL EXAM:  GENERAL: NAD, well-developed  HEAD:  Atraumatic, Normocephalic  EYES: EOMI, PERRLA, conjunctiva and sclera clear  NECK: Supple, No JVD  CHEST/LUNG: Clear to auscultation bilaterally; No wheeze  HEART: Regular rate and rhythm; No murmurs, rubs, or gallops  ABDOMEN: Soft, Nontender, Nondistended; Bowel sounds present  EXTREMITIES:  2+ Peripheral Pulses, No clubbing, cyanosis, or edema  PSYCH: stuperous  NEUROLOGY: non-focal  SKIN: No rashes or lesions    Labs Reviewed  Spoke to patient in regards to abnormal labs.    CBC Full  -  ( 28 Apr 2020 05:43 )  WBC Count : 13.40 K/uL  Hemoglobin : 10.5 g/dL  Hematocrit : 33.6 %  Platelet Count - Automated : 87 K/uL  Mean Cell Volume : 93.3 fL  Mean Cell Hemoglobin : 29.2 pg  Mean Cell Hemoglobin Concentration : 31.3 g/dL  Auto Neutrophil # : 12.28 K/uL  Auto Lymphocyte # : 0.70 K/uL  Auto Monocyte # : 0.23 K/uL  Auto Eosinophil # : 0.00 K/uL  Auto Basophil # : 0.08 K/uL  Auto Neutrophil % : 91.7 %  Auto Lymphocyte % : 5.2 %  Auto Monocyte % : 1.7 %  Auto Eosinophil % : 0.0 %  Auto Basophil % : 0.6 %    BMP:    04-28 @ 05:43    Blood Urea Nitrogen - 69  Calcium - 7.8  Carbond Dioxide - 21  Chloride - 120  Creatinine - 1.1  Glucose - 145  Potassium - 3.0  Sodium - 158      Hemoglobin A1c -   PT/INR - ( 25 Apr 2020 15:00 )   PT: 14.90 sec;   INR: 1.30 ratio         PTT - ( 25 Apr 2020 15:00 )  PTT:21.7 sec  Urine Culture:  04-25 @ 20:00 Urine culture: --    Culture Results:   No growth  Method Type: --  Organism: --  Organism Identification: --  Specimen Source: .Urine Catheterized  04-25 @ 16:10 Urine culture: --    Culture Results:   No growth to date.  Method Type: --  Organism: --  Organism Identification: --  Specimen Source: .Blood Blood        Imaging reviewed:   < from: CT Abdomen and Pelvis w/ Oral Cont and w/ IV Cont (04.28.20 @ 11:43) >  IMPRESSION:     1.  Right lower lobe consolidation, compatible with pneumonia.  2.  Distended gallbladder with mild wall edema, nonspecific.  3.  Urinary bladder distended despite presence of Banegas catheter.  4.  Nonspecific endometrial enhancement. Recommend further evaluation with nonemergent outpatient pelvic sonogram as clinically warranted.  5.  Large rectal stool burden.    < end of copied text >      < from: Xray Chest 1 View- PORTABLE-Urgent (04.27.20 @ 17:40) >  IMPRESSION:      1. Enteric tube coiled in the stomach.  2. New bilateral opacities, right worse than left, and small right pleural effusion.    < end of copied text >    MEDICATIONS  (STANDING):  cefepime   IVPB      cefepime   IVPB 1000 milliGRAM(s) IV Intermittent every 24 hours  chlorhexidine 4% Liquid 1 Application(s) Topical <User Schedule>  heparin   Injectable 5000 Unit(s) SubCutaneous every 12 hours  iohexol 300 mG (iodine)/mL Oral Solution 30 milliLiter(s) Oral once  metroNIDAZOLE  IVPB 500 milliGRAM(s) IV Intermittent every 8 hours  metroNIDAZOLE  IVPB      multivitamin  Chewable 1 Tablet(s) Oral daily  pantoprazole   Suspension 40 milliGRAM(s) Enteral Tube <User Schedule>  potassium chloride   Powder 40 milliEquivalent(s) Oral every 4 hours    MEDICATIONS  (PRN):  acetaminophen  Suppository .. 650 milliGRAM(s) Rectal every 6 hours PRN Temp greater or equal to 38C (100.4F)

## 2020-04-28 NOTE — SWALLOW BEDSIDE ASSESSMENT ADULT - SWALLOW EVAL: RECOMMENDED DIET
NPO with alternate means for nutrition/ hydration
NPO with alternate means for nutrition/ hydration
NPO w/ alternate means of nutrition/hydration

## 2020-04-28 NOTE — CONSULT NOTE ADULT - SUBJECTIVE AND OBJECTIVE BOX
ANDER TRACEY [id number]  89y Female  3d    HPI:  The patient is a 89-year-old female with a PMH of HTN, type II DM, DLD, CKD stage III, and cognitive impairment who presented with generalized weakness for the past one week.  History was obtained from the patient's family members over the telephone due to the patient's mental status.  She has had decreased oral intake over the past 3-4 days.  At her baseline, the patient is non-verbal and ambulates with assistance.  However over the past one week she's been mostly bedbound.  The patient had an episode of vomiting today.  She also appeared to have labored breathing per family today.  Otherwise, the patient reportedly did not have fever, chills, abdominal pain, bowel or urinary complaints. Patient was admitted for workup and treatment of weakness 2/2 pneumonia as well as BROOKS and electrolyte abnormalities. Of note, patient's WBC increased 4/27 to 16.85 (now 12.75), on cefepime and flagyl. CT abdomen/pelvis obtained today for unknown etiology of leukocytosis, right lower lobe consolidation, distended gallbladder with mild wall edema, and large rectal stool burden. Today she was noted to have blood per rectum.     Reason for Consult: Blood per rectum     PAST MEDICAL & SURGICAL HISTORY:  High cholesterol  Diabetes  Advanced dementia  No pertinent past medical history  S/P cataract surgery    MEDICATIONS  (STANDING):  cefepime   IVPB      cefepime   IVPB 1000 milliGRAM(s) IV Intermittent every 24 hours  chlorhexidine 4% Liquid 1 Application(s) Topical <User Schedule>  glycerin Suppository - Adult 1 Suppository(s) Rectal at bedtime  iohexol 300 mG (iodine)/mL Oral Solution 30 milliLiter(s) Oral once  metroNIDAZOLE  IVPB 500 milliGRAM(s) IV Intermittent every 8 hours  metroNIDAZOLE  IVPB      multivitamin  Chewable 1 Tablet(s) Oral daily  pantoprazole   Suspension 40 milliGRAM(s) Enteral Tube <User Schedule>  polyethylene glycol 3350 17 Gram(s) Oral two times a day  potassium chloride   Powder 40 milliEquivalent(s) Oral every 4 hours    MEDICATIONS  (PRN):  acetaminophen    Suspension .. 650 milliGRAM(s) Enteral Tube every 6 hours PRN Temp greater or equal to 38C (100.4F)  acetaminophen  Suppository .. 650 milliGRAM(s) Rectal every 6 hours PRN Temp greater or equal to 38C (100.4F)  bisacodyl Suppository 10 milliGRAM(s) Rectal <User Schedule> PRN Constipation    Allergies  No Known Allergies  Intolerances    REVIEW OF SYSTEMS    [ ] A ten-point review of systems was otherwise negative except as noted.  [ X ] Due to altered mental status/intubation, subjective information were not able to be obtained from the patient. History was obtained, to the extent possible, from review of the chart and collateral sources of information.    Vital Signs Last 24 Hrs  T(C): 39.1 (28 Apr 2020 17:58), Max: 39.1 (28 Apr 2020 17:58)  T(F): 102.4 (28 Apr 2020 17:58), Max: 102.4 (28 Apr 2020 17:58)  HR: 102 (28 Apr 2020 17:58) (86 - 102)  BP: 104/51 (28 Apr 2020 17:58) (97/55 - 104/51)  RR: 16 (28 Apr 2020 17:58) (16 - 97)  SpO2: 98% (28 Apr 2020 17:58) (96% - 98%)    PHYSICAL EXAM:  GENERAL: Nonverbal   CHEST/LUNG: Clear to auscultation bilaterally  HEART: Regular rate and rhythm  ABDOMEN: Soft, nondistended, unable to assess tenderness   EXTREMITIES:  No clubbing, cyanosis, or edema  RECTAL: Maroon blood per rectum, consistent with melena     LABS:                        9.5    12.75 )-----------( 89       ( 28 Apr 2020 19:27 )             29.5       Auto Neutrophil %: 84.2 % (04-28-20 @ 19:27)  Auto Immature Granulocyte %: 0.5 % (04-28-20 @ 19:27)  Auto Immature Granulocyte %: 0.8 % (04-28-20 @ 05:43)  Auto Neutrophil %: 91.7 % (04-28-20 @ 05:43)    04-28    158<H>  |  120<H>  |  69<HH>  ----------------------------<  145<H>  3.0<L>   |  21  |  1.1    Calcium, Total Serum: 7.8 mg/dL (04-28-20 @ 05:43)    LFTs:             4.9  | 0.6  | 118      ------------------[121     ( 28 Apr 2020 05:43 )  2.2  | x    | 73          Lactate, Blood: 6.0 mmol/L (04-26-20 @ 07:31)  Blood Gas Arterial, Lactate: 5.0 mmoL/L (04-26-20 @ 00:26)    ABG - ( 26 Apr 2020 00:26 )  pH: 7.49  /  pCO2: 33    /  pO2: 92    / HCO3: 26    / Base Excess: 2.4   /  SaO2: 98        Serum Pro-Brain Natriuretic Peptide: 5132 pg/mL (04-25-20 @ 15:00)    Culture - Urine (collected 25 Apr 2020 20:00)  Source: .Urine Catheterized  Final Report (27 Apr 2020 10:09):    No growth    RADIOLOGY & ADDITIONAL STUDIES:  < from: CT Abdomen and Pelvis w/ Oral Cont and w/ IV Cont (04.28.20 @ 11:43) >  IMPRESSION:   1.  Right lower lobe consolidation, compatible with pneumonia.  2.  Distended gallbladder with mild wall edema, nonspecific.  3.  Urinary bladder distended despite presence of Banegas catheter.  4.  Nonspecific endometrial enhancement. Recommend further evaluation with nonemergent outpatient pelvic sonogram as clinically warranted.  5.  Large rectal stool burden.

## 2020-04-28 NOTE — CONSULT NOTE ADULT - ASSESSMENT
ASSESSMENT   89-year-old female with a PMH of HTN, type II DM, DLD, CKD stage III, and cognitive impairment who presented with generalized weakness for the past one week      IMPRESSION  #RLL PNA possible aspiration    CXR New bilateral opacities, right worse than left, and small right pleural effusion. (not present on admission 4/25 CXR )    Tm 100.6, Severe sepsis on admission P>90 WBC >12, BROOKS    4/25 UCX NGTD     4/25 BCX NGTD     COVID-19 PCR: NotDetec (04-25-20 @ 15:28)    Procalcitonin, Serum: 0.23 ng/mL (04-25-20 @ 15:00)  #Hypernatremia  #Elevated Trop BNP  #Transaminitis     TBili  0.7  /  DBili  x   /  AST  113<H>  /  ALT  75<H>  /  AlkPhos  97 4/27  #Thrombocytopenia  #QTC prolongation QTC Calculation(Bezet) 505 ms      RECOMMENDATIONS  - Continue cefepime/flagyl IV  - Send urine Ag for Strep and Legionella   - Send sputum cx if possible  - D/C Flagyl 500mg q8h IV   This is an incomplete pended note, all final recommendations to follow.     Spectra [id number] ASSESSMENT   89-year-old female with a PMH of HTN, type II DM, DLD, CKD stage III, and cognitive impairment who presented with generalized weakness for the past one week      IMPRESSION  #RLL PNA possible aspiration    CXR New bilateral opacities, right worse than left, and small right pleural effusion. (not present on admission 4/25 CXR )    Tm 100.6, Severe sepsis on admission P>90 WBC >12, BROOKS    4/25 UCX NGTD     4/25 BCX NGTD     COVID-19 PCR: NotDetec (04-25-20 @ 15:28)    Procalcitonin, Serum: 0.23 ng/mL (04-25-20 @ 15:00)  #Hypernatremia  #Elevated Trop BNP  #Transaminitis     TBili  0.7  /  DBili  x   /  AST  113<H>  /  ALT  75<H>  /  AlkPhos  97 4/27  #Thrombocytopenia  #QTC prolongation QTC Calculation(Bezet) 505 ms      RECOMMENDATIONS  - Continue cefepime/flagyl IV  - Send urine Ag for Strep and Legionella   - Send sputum cx if possible    Spectra [id number]

## 2020-04-28 NOTE — CONSULT NOTE ADULT - SUBJECTIVE AND OBJECTIVE BOX
ANDER TRACEY  89y, Female  Allergy: No Known Allergies      CHIEF COMPLAINT: Generalized weakness (27 Apr 2020 19:56)      LOS  3d    HPI:  The patient is a 89-year-old female with a PMH of HTN, type II DM, DLD, CKD stage III, and cognitive impairment who presented with generalized weakness for the past one week.  History was obtained from the patient's family members over the telephone due to the patient's mental status.  She has had decreased oral intake over the past 3-4 days.  At her baseline, the patient is non-verbal and ambulates with assistance.  However over the past one week she's been mostly bedbound.  The patient had an episode of vomiting today.  She also appeared to have labored breathing per family today.  Otherwise, the patient reportedly did not have fever, chills, abdominal pain, bowel or urinary complaints.  The patient's family denied any recent sick contacts. (25 Apr 2020 18:44)      INFECTIOUS DISEASE HISTORY:    PAST MEDICAL & SURGICAL HISTORY:  High cholesterol  Diabetes  Advanced dementia  S/P cataract surgery      FAMILY HISTORY  non-contributory     SOCIAL HISTORY  Social History: unable to obtain history secondary to patient's mental status and/or sedation         ROS  unable to obtain history secondary to patient's mental status and/or sedation     VITALS:  T(F): 99.6, Max: 99.6 (04-28-20 @ 00:17)  HR: 94  BP: 99/55  RR: 18Vital Signs Last 24 Hrs  T(C): 37.6 (28 Apr 2020 00:17), Max: 37.6 (28 Apr 2020 00:17)  T(F): 99.6 (28 Apr 2020 00:17), Max: 99.6 (28 Apr 2020 00:17)  HR: 94 (28 Apr 2020 00:17) (87 - 94)  BP: 99/55 (28 Apr 2020 00:17) (99/55 - 108/55)  BP(mean): --  RR: 18 (28 Apr 2020 00:17) (18 - 19)  SpO2: 96% (28 Apr 2020 00:17) (96% - 98%)    PHYSICAL EXAM:  **    TESTS & MEASUREMENTS:                        10.5   13.40 )-----------( 87       ( 28 Apr 2020 05:43 )             33.6     04-27    152<H>  |  117<H>  |  80<HH>  ----------------------------<  159<H>  3.6   |  20  |  1.1    Ca    7.8<L>      27 Apr 2020 13:50  Phos  2.9     04-27  Mg     2.4     04-27    TPro  5.1<L>  /  Alb  2.5<L>  /  TBili  0.7  /  DBili  x   /  AST  113<H>  /  ALT  75<H>  /  AlkPhos  97  04-27    eGFR if Non African American: 43 mL/min/1.73M2 (04-27-20 @ 13:50)  eGFR if African American: 50 mL/min/1.73M2 (04-27-20 @ 13:50)    LIVER FUNCTIONS - ( 27 Apr 2020 13:50 )  Alb: 2.5 g/dL / Pro: 5.1 g/dL / ALK PHOS: 97 U/L / ALT: 75 U/L / AST: 113 U/L / GGT: x               Culture - Urine (collected 04-25-20 @ 20:00)  Source: .Urine Catheterized  Final Report (04-27-20 @ 10:09):    No growth    Culture - Blood (collected 04-25-20 @ 16:10)  Source: .Blood Blood  Preliminary Report (04-27-20 @ 01:02):    No growth to date.    Culture - Blood (collected 04-25-20 @ 16:10)  Source: .Blood Blood  Preliminary Report (04-27-20 @ 01:02):    No growth to date.        Lactate, Blood: 6.0 mmol/L (04-26-20 @ 07:31)      INFECTIOUS DISEASES TESTING  COVID-19 PCR: NotDetec (04-25-20 @ 15:28)  Procalcitonin, Serum: 0.23 ng/mL (04-25-20 @ 15:00)      RADIOLOGY & ADDITIONAL TESTS:  I have personally reviewed the last Chest xray  CXR  Xray Chest 1 View- PORTABLE-Urgent:   EXAM:  XR CHEST PORTABLE URGENT 1V            PROCEDURE DATE:  04/27/2020            INTERPRETATION:  CLINICAL HISTORY: Tube placement. Hypoxia, leukocytosis.    COMPARISON: 4/25/2020.    TECHNIQUE/POSITIONING: Frontal view of the chest.    FINDINGS:    Support devices: Enteric tube coiled in the stomach.    Cardiac/mediastinum/hilum: Aortic calcifications.    Lung parenchyma/Pleura: New bilateral opacities, right worse than left. Small right pleural effusion.    Skeleton/soft tissues: Severe degenerative change in the shoulders.      IMPRESSION:      1. Enteric tube coiled in the stomach.  2. New bilateral opacities, right worse than left, and small right pleural effusion.                  RENAN BRADEN M.D., ATTENDING RADIOLOGIST  This document has been electronically signed. Apr 27 2020  5:46PM             (04-27-20 @ 17:40)      CT      CARDIOLOGY TESTING  12 Lead ECG:   Ventricular Rate 98 BPM    Atrial Rate 98 BPM    P-R Interval 110 ms    QRS Duration 70 ms    Q-T Interval 396 ms    QTC Calculation(Bezet) 505 ms    P Axis -28 degrees    R Axis 64 degrees    T Axis 63 degrees    Diagnosis Line Sinus rhythm with short [place]  ST & T wave abnormality, consider anterior ischemia  Prolonged QT  Abnormal ECG    Confirmed by JANIE ADAMS, [facility] ([id number]) on 4/26/2020 12:17:20 AM (04-25-20 @ 16:51)      MEDICATIONS  cefepime   IVPB   cefepime   IVPB 1000  chlorhexidine 4% Liquid 1  heparin   Injectable 5000  iohexol 300 mG (iodine)/mL Oral Solution 30  metroNIDAZOLE  IVPB   metroNIDAZOLE  IVPB 500  multivitamin  Chewable 1  pantoprazole   Suspension 40  sodium chloride 0.45%. 1000      Weight  Weight (kg): 44 (04-25-20 @ 20:25)    ANTIBIOTICS:  cefepime   IVPB      cefepime   IVPB 1000 milliGRAM(s) IV Intermittent every 24 hours  metroNIDAZOLE  IVPB      metroNIDAZOLE  IVPB 500 milliGRAM(s) IV Intermittent every 8 hours      ALLERGIES:  No Known Allergies ANDER TRACEY  89y, Female  Allergy: No Known Allergies      CHIEF COMPLAINT: Generalized weakness (27 Apr 2020 19:56)      LOS  3d    HPI:  The patient is a 89-year-old female with a PMH of HTN, type II DM, DLD, CKD stage III, and cognitive impairment who presented with generalized weakness for the past one week.  History was obtained from the patient's family members over the telephone due to the patient's mental status.  She has had decreased oral intake over the past 3-4 days.  At her baseline, the patient is non-verbal and ambulates with assistance.  However over the past one week she's been mostly bedbound.  The patient had an episode of vomiting today.  She also appeared to have labored breathing per family today.  Otherwise, the patient reportedly did not have fever, chills, abdominal pain, bowel or urinary complaints.  The patient's family denied any recent sick contacts. (25 Apr 2020 18:44)      INFECTIOUS DISEASE HISTORY:  cannot obtain further hx from the patient    PAST MEDICAL & SURGICAL HISTORY:  High cholesterol  Diabetes  Advanced dementia  S/P cataract surgery      FAMILY HISTORY  non-contributory     SOCIAL HISTORY  Social History: unable to obtain history secondary to patient's mental status and/or sedation         ROS  unable to obtain history secondary to patient's mental status and/or sedation     VITALS:  T(F): 99.6, Max: 99.6 (04-28-20 @ 00:17)  HR: 94  BP: 99/55  RR: 18Vital Signs Last 24 Hrs  T(C): 37.6 (28 Apr 2020 00:17), Max: 37.6 (28 Apr 2020 00:17)  T(F): 99.6 (28 Apr 2020 00:17), Max: 99.6 (28 Apr 2020 00:17)  HR: 94 (28 Apr 2020 00:17) (87 - 94)  BP: 99/55 (28 Apr 2020 00:17) (99/55 - 108/55)  BP(mean): --  RR: 18 (28 Apr 2020 00:17) (18 - 19)  SpO2: 96% (28 Apr 2020 00:17) (96% - 98%)    PHYSICAL EXAM:  Gen: elderly F, not responding  Neck: Supple, no cervical LAD  CV: RRR  Lungs: CTAB, no w/r/r  Abd: Soft. NTND  Extr: wwp, no edema  Skin: no rash  Neuro: No focal deficits  Lines: clean       TESTS & MEASUREMENTS:                        10.5   13.40 )-----------( 87       ( 28 Apr 2020 05:43 )             33.6     04-27    152<H>  |  117<H>  |  80<HH>  ----------------------------<  159<H>  3.6   |  20  |  1.1    Ca    7.8<L>      27 Apr 2020 13:50  Phos  2.9     04-27  Mg     2.4     04-27    TPro  5.1<L>  /  Alb  2.5<L>  /  TBili  0.7  /  DBili  x   /  AST  113<H>  /  ALT  75<H>  /  AlkPhos  97  04-27    eGFR if Non African American: 43 mL/min/1.73M2 (04-27-20 @ 13:50)  eGFR if African American: 50 mL/min/1.73M2 (04-27-20 @ 13:50)    LIVER FUNCTIONS - ( 27 Apr 2020 13:50 )  Alb: 2.5 g/dL / Pro: 5.1 g/dL / ALK PHOS: 97 U/L / ALT: 75 U/L / AST: 113 U/L / GGT: x               Culture - Urine (collected 04-25-20 @ 20:00)  Source: .Urine Catheterized  Final Report (04-27-20 @ 10:09):    No growth    Culture - Blood (collected 04-25-20 @ 16:10)  Source: .Blood Blood  Preliminary Report (04-27-20 @ 01:02):    No growth to date.    Culture - Blood (collected 04-25-20 @ 16:10)  Source: .Blood Blood  Preliminary Report (04-27-20 @ 01:02):    No growth to date.        Lactate, Blood: 6.0 mmol/L (04-26-20 @ 07:31)      INFECTIOUS DISEASES TESTING  COVID-19 PCR: NotDetec (04-25-20 @ 15:28)  Procalcitonin, Serum: 0.23 ng/mL (04-25-20 @ 15:00)      RADIOLOGY & ADDITIONAL TESTS:  I have personally reviewed the last Chest xray  CXR  Xray Chest 1 View- PORTABLE-Urgent:   EXAM:  XR CHEST PORTABLE URGENT 1V            PROCEDURE DATE:  04/27/2020            INTERPRETATION:  CLINICAL HISTORY: Tube placement. Hypoxia, leukocytosis.    COMPARISON: 4/25/2020.    TECHNIQUE/POSITIONING: Frontal view of the chest.    FINDINGS:    Support devices: Enteric tube coiled in the stomach.    Cardiac/mediastinum/hilum: Aortic calcifications.    Lung parenchyma/Pleura: New bilateral opacities, right worse than left. Small right pleural effusion.    Skeleton/soft tissues: Severe degenerative change in the shoulders.      IMPRESSION:      1. Enteric tube coiled in the stomach.  2. New bilateral opacities, right worse than left, and small right pleural effusion.                  RENAN BRADEN M.D., ATTENDING RADIOLOGIST  This document has been electronically signed. Apr 27 2020  5:46PM             (04-27-20 @ 17:40)      CT      CARDIOLOGY TESTING  12 Lead ECG:   Ventricular Rate 98 BPM    Atrial Rate 98 BPM    P-R Interval 110 ms    QRS Duration 70 ms    Q-T Interval 396 ms    QTC Calculation(Bezet) 505 ms    P Axis -28 degrees    R Axis 64 degrees    T Axis 63 degrees    Diagnosis Line Sinus rhythm with short [place]  ST & T wave abnormality, consider anterior ischemia  Prolonged QT  Abnormal ECG    Confirmed by JANIE ADAMS, [facility] ([id number]) on 4/26/2020 12:17:20 AM (04-25-20 @ 16:51)      MEDICATIONS  cefepime   IVPB   cefepime   IVPB 1000  chlorhexidine 4% Liquid 1  heparin   Injectable 5000  iohexol 300 mG (iodine)/mL Oral Solution 30  metroNIDAZOLE  IVPB   metroNIDAZOLE  IVPB 500  multivitamin  Chewable 1  pantoprazole   Suspension 40  sodium chloride 0.45%. 1000      Weight  Weight (kg): 44 (04-25-20 @ 20:25)    ANTIBIOTICS:  cefepime   IVPB      cefepime   IVPB 1000 milliGRAM(s) IV Intermittent every 24 hours  metroNIDAZOLE  IVPB      metroNIDAZOLE  IVPB 500 milliGRAM(s) IV Intermittent every 8 hours      ALLERGIES:  No Known Allergies

## 2020-04-28 NOTE — PROGRESS NOTE ADULT - SUBJECTIVE AND OBJECTIVE BOX
Nephrology progress note    THIS IS AN INCOMPLETE NOTE . FULL NOTE TO FOLLOW SHORTLY    Patient is seen and examined, events over the last 24 h noted .    Allergies:  No Known Allergies    Hospital Medications:   MEDICATIONS  (STANDING):  cefepime   IVPB      cefepime   IVPB 1000 milliGRAM(s) IV Intermittent every 24 hours  chlorhexidine 4% Liquid 1 Application(s) Topical <User Schedule>  heparin   Injectable 5000 Unit(s) SubCutaneous every 12 hours  iohexol 300 mG (iodine)/mL Oral Solution 30 milliLiter(s) Oral once  metroNIDAZOLE  IVPB      metroNIDAZOLE  IVPB 500 milliGRAM(s) IV Intermittent every 8 hours  multivitamin  Chewable 1 Tablet(s) Oral daily  pantoprazole   Suspension 40 milliGRAM(s) Enteral Tube <User Schedule>  sodium chloride 0.45%. 1000 milliLiter(s) (50 mL/Hr) IV Continuous <Continuous>        VITALS:  T(F): 97.6 (20 @ 08:00), Max: 99.6 (20 @ 00:17)  HR: 86 (20 @ 08:00)  BP: 97/55 (20 @ 08:00)  RR: 19 (20 @ 08:00)  SpO2: 96% (20 @ 00:17)  Wt(kg): --     @ 07:  -   @ 07:00  --------------------------------------------------------  IN: 900 mL / OUT: 475 mL / NET: 425 mL     @ 07:  -   @ 07:00  --------------------------------------------------------  IN: 0 mL / OUT: 1002 mL / NET: -1002 mL      Height (cm): 152.4 ( @ 12:24)    PHYSICAL EXAM:  Constitutional: NAD  HEENT: anicteric sclera, oropharynx clear, MMM  Neck: No JVD  Respiratory: CTAB, no wheezes, rales or rhonchi  Cardiovascular: S1, S2, RRR  Gastrointestinal: BS+, soft, NT/ND  Extremities: No cyanosis or clubbing. No peripheral edema  :  No bond.   Skin: No rashes    LABS:      158<H>  |  120<H>  |  69<HH>  ----------------------------<  145<H>  3.0<L>   |  21  |  1.1  SODIUM TREND:  Sodium 158 [ @ 05:43]  Sodium 152 [ @ 13:50]  Sodium 159 [ @ 18:18]  Sodium 165 [ @ 07:31]  Sodium 169 [ @ 23:30]  Sodium 171 [ @ 21:54]  Sodium 174 [ @ 15:00]    Ca    7.8<L>      2020 05:43  Phos  2.8       Mg     2.5         TPro  4.9<L>  /  Alb  2.2<L>  /  TBili  0.6  /  DBili      /  AST  118<H>  /  ALT  73<H>  /  AlkPhos  121<H>                            10.5   13.40 )-----------( 87       ( 2020 05:43 )             33.6       Urine Studies:  Urinalysis Basic - ( 2020 20:00 )    Color: Yellow / Appearance: Clear / S.023 / pH:   Gluc:  / Ketone: Negative  / Bili: Negative / Urobili: <2 mg/dL   Blood:  / Protein: 100 mg/dL / Nitrite: Negative   Leuk Esterase: Negative / RBC: 2 /HPF / WBC 3 /HPF   Sq Epi:  / Non Sq Epi: 6 /HPF / Bacteria: Negative      Creatinine, Random Urine: 95 mg/dL ( @ 20:00)  Sodium, Random Urine: 25.0 mmoL/L ( @ 20:00)  Osmolality, Random Urine: 608 mos/kg ( @ 20:00)    RADIOLOGY & ADDITIONAL STUDIES: Nephrology progress note  Patient is seen and examined, events over the last 24 h noted .  lethargic weak     Allergies:  No Known Allergies    Hospital Medications:   MEDICATIONS  (STANDING):  cefepime   IVPB 1000 milliGRAM(s) IV Intermittent every 24 hours  chlorhexidine 4% Liquid 1 Application(s) Topical <User Schedule>  heparin   Injectable 5000 Unit(s) SubCutaneous every 12 hours  iohexol 300 mG (iodine)/mL Oral Solution 30 milliLiter(s) Oral once  metroNIDAZOLE  IVPB 500 milliGRAM(s) IV Intermittent every 8 hours  multivitamin  Chewable 1 Tablet(s) Oral daily  pantoprazole   Suspension 40 milliGRAM(s) Enteral Tube <User Schedule>  sodium chloride 0.45%. 1000 milliLiter(s) (50 mL/Hr) IV Continuous <Continuous>        VITALS:  T(F): 97.6 (20 @ 08:00), Max: 99.6 (20 @ 00:17)  HR: 86 (20 @ 08:00)  BP: 97/55 (20 @ 08:00)  RR: 19 (20 @ 08:00)  SpO2: 96% (20 @ 00:17)       @ 07:  -   @ 07:00  --------------------------------------------------------  IN: 900 mL / OUT: 475 mL / NET: 425 mL     @ :  -   @ 07:00  --------------------------------------------------------  IN: 0 mL / OUT: 1002 mL / NET: -1002 mL      Height (cm): 152.4 ( @ 12:24)    PHYSICAL EXAM:  Constitutional: NAD  HEENT: anicteric sclera, oropharynx clear, MMM  Neck: No JVD  Respiratory: CTAB, no wheezes, rales or rhonchi  Cardiovascular: S1, S2, RRR  Gastrointestinal: BS+, soft, NT/ND  Extremities: No cyanosis or clubbing. No peripheral edema  :  No bond.   Skin: No rashes    LABS:      158<H>  |  120<H>  |  69<HH>  ----------------------------<  145<H>  3.0<L>   |  21  |  1.1  SODIUM TREND:  Sodium 158 [ @ 05:43]  Sodium 152 [ @ 13:50]  Sodium 159 [ @ 18:18]  Sodium 165 [ @ 07:31]  Sodium 169 [ @ 23:30]  Sodium 171 [ @ 21:54]  Sodium 174 [ @ 15:00]    Ca    7.8<L>      2020 05:43  Phos  2.8       Mg     2.5         TPro  4.9<L>  /  Alb  2.2<L>  /  TBili  0.6  /  DBili      /  AST  118<H>  /  ALT  73<H>  /  AlkPhos  121<H>                            10.5   13.40 )-----------( 87       ( 2020 05:43 )             33.6       Urine Studies:  Urinalysis Basic - ( 2020 20:00 )    Color: Yellow / Appearance: Clear / S.023 / pH:   Gluc:  / Ketone: Negative  / Bili: Negative / Urobili: <2 mg/dL   Blood:  / Protein: 100 mg/dL / Nitrite: Negative   Leuk Esterase: Negative / RBC: 2 /HPF / WBC 3 /HPF   Sq Epi:  / Non Sq Epi: 6 /HPF / Bacteria: Negative      Creatinine, Random Urine: 95 mg/dL ( @ 20:00)  Sodium, Random Urine: 25.0 mmoL/L ( @ 20:00)  Osmolality, Random Urine: 608 mos/kg ( @ 20:00)    RADIOLOGY & ADDITIONAL STUDIES:

## 2020-04-28 NOTE — SWALLOW BEDSIDE ASSESSMENT ADULT - SLP PERTINENT HISTORY OF CURRENT PROBLEM
89-year-old female who presented to ED with generalized weakness, decreased oral intake, and  labored breathing. Patient with a B/L of cognitive impairment and a PMHx relevant for advanced dementia, HTN, type II DM, DLD, CKD stage III,.
89-year-old female who presented to ED with generalized weakness, decreased oral intake, and  labored breathing. Patient with a B/L of cognitive impairment and a PMHx relevant for advanced dementia, HTN, type II DM, DLD, CKD stage III
89-year-old female who presented to ED with generalized weakness, decreased oral intake, and  labored breathing. Patient with a B/L of cognitive impairment and a PMHx relevant for advanced dementia, HTN, type II DM, DLD, CKD stage III,.

## 2020-04-28 NOTE — SWALLOW BEDSIDE ASSESSMENT ADULT - NS ASR SWALLOW FINDINGS DISCUS
Nursing/RN MD Steve Christian made aware/Physician
Nursing/Physician
Physician/Nursing/MD Steve aware

## 2020-04-28 NOTE — SWALLOW BEDSIDE ASSESSMENT ADULT - SLP GENERAL OBSERVATIONS
Pt. receive asleep, awoke with max multisensory cues, variable mental status, non verbal, unable to express wants/ needs. o2 via NC.
Pt received laying in bed, NGT in place, asleep and unable to sustain sufficient arousal for any PO intake. Pt drooling from R side.
decreased responsiveness, despite max encouragement by SLP

## 2020-04-28 NOTE — PROGRESS NOTE ADULT - ASSESSMENT
A 89-year-old female with a PMH of HTN, type II DM, DLD, CKD stage III, and cognitive impairment who presented with generalized weakness for the past one week and decreased oral intake.    1. Generalized weakness 2/2 suspected GN pneumonia   - Pt is severely dehydrated   -CT abd showed RL Pna possible 2/2 gn bacteria, cxr showing new pneumonia on right, possible aspiration, pt NPO  -distended GB- DW IR not perc drain candidate for now as GB distention is minimal  - Sacral ulcer seen by burn, not infected  -no phlebitis seen  -ID consult appreciated- continue flagyl and cefipeme   -will do ct abd and pelvic with contrast, Cr improved, continue hydration  -continue cefapime and flagyl   -COVID neg  - sepsis present on admission  - CXR: no evidence of acute cardio-pulmonary disease  - U/A: no leukocytes, no nitrites    2. BROOKS on CKD stage III with HAGMA (improved)   - likely pre-renal etiology vs. ATN  - last Cr was 1.1; was 2.8 on admission  - FENa is 0.4% (pre-renal)  - nephrology consult appreciated, continue IVF  0.45 Nacl    3. Hyperosm Hypernatremia \  -158 today, since pt has NGT DCed IVF and started on Free water, 300 cc q6hrs  - likely due to dehydration  - corrected sodium level is 178-->158  -follow up BMP at 4pm and 1130     #right facial droop likely old   unlikley new as family said she stopped speaking 3 months ago as per house staff, possible cva   CTH cerbral atrophy    #nutrition  -continue NGT feeding       4. Elevated troponin  - likely demand ischemia  -trending down    5. Elevated liver enzymes  - DDx includes sepsis   - check THAD, AMA, ASMA, iron studies, ceruloplasmin, anti-LKM1, hepatitis panel,   -improving     6. HTN  - hold Atenolol as patient's BP currently low-normal; resume when BP stable    7. Type II DM  - hold Metformin  - monitor FS and start insulin if FS persistently >180    8. Cognitive impairment likely dementia  - continue supportive measures per nursing staff    9. DLD  - hold statin due to elevated LFTs    10. DVT prophylaxis  - heparin SQ BID    11. GI prophylaxis  - PPI daily    #Progress Note Handoff  Pending (specify): continue antibiotics, monitor for  improvement   Family discussion: dw family agreed to plan above  Disposition: Home___/SNF___/Other___/Unknown at this time_x_  Poor prognosis  follow up palliative care  Pt seen during COVID 19 Pandemic.

## 2020-04-28 NOTE — PROGRESS NOTE ADULT - SUBJECTIVE AND OBJECTIVE BOX
The patient is a 89-year-old female with a PMH of HTN, type II DM, DLD, CKD stage III, and cognitive impairment due to severe advanced dementia, who presented on 4/25/2020 with generalized weakness for the past one week.  History was obtained from the patient's family members over the telephone due to the patient's mental status.  She has had decreased oral intake over the past 3-4 days.  At her baseline, the patient is non-verbal and ambulates with assistance.  However over the past one week she's been mostly bedbound.  The patient had an episode of vomiting today.  She also appeared to have labored breathing per family today.  Otherwise, the patient reportedly did not have fever, chills, abdominal pain, bowel or urinary complaints.  The patient's family denied any recent sick contacts. (25 Apr 2020 18:44).  She tested negative for covid on 4/25/2020.    She was extremely dehydrated when admitted, BUN /CREAT = 117/2.8, Na+=174; she was given iv fluids D5W then D51/2 NS and labs are improving slowly.  BUN decreased to 69 this AM and Na+ decreased to 158.    An ng tube was placed on 4/27 due to patient could not eat because of severe dementia and lethargy, and also to give her free water.  CXR 4/27 and 4/28 showed b/l infiltrates R>L.  She was seen by ID and started IV cefepime and IV flagyl for pneumonia, she also had CT abdomen and pelvis today with po and IV contrast, to r/o   abscess. Urine for Strep pneumonia and Legionella was also ordered. When she was checked by the nursing staff this afternoon, she was found to be covered in blood. When she was cleaned up, the blood was seen to  be coming from the rectum. It is dark maroon with large clots. She is still passing maroon stool but is comfortable. She also spiked a fever of 102.4. STAT CBC, BMP, Mg++, PO4, T & S, PT/PTT   were drawn. The rapid response nursing team placed two 18-gauge iv lines. IV Protonix 40mg q12h was started as per GI, and SC heparin was stopped.  Her platelets have been low (89,000 today) and PT/PTT and LFT's are elevated. HIT antibodies are pending.  She did drop her pressure to 90/42 and HR was 110. GI and Surgery were called, Surgery saw her and examined her and said it was not a surgical issue,  it's a GI  bleed. Spoke to GI fellow and he will see her tomorrow, or sooner if she decompensates further. IV fluids are being given: 500cc NS bolus then  D51/2NS @75cc/hr. Labs will be checked q12hr.    The patient is DNR/DNI, however I spoke to her family this evening (grandson and daughter Claire, with whom the patient lives, Claire makes all decisions for her   mom) and they want everything necessary to be done for the patient, including EGD/colonoscopy and blood transfusions. Verbal phone consent was obtained  for transfusion and was witnessed by nursing staff.    Vital Signs Last 24 Hrs  T(C): 37.8 (28 Apr 2020 21:28), Max: 39.1 (28 Apr 2020 17:58)  T(F): 100.1 (28 Apr 2020 21:28), Max: 102.4 (28 Apr 2020 17:58)  HR: 99 (28 Apr 2020 21:28) (86 - 102)  BP: 106/66 (28 Apr 2020 21:28) (97/55 - 106/66)  BP(mean): --  RR: 19 (28 Apr 2020 21:28) (16 - 97)  SpO2: 99% (28 Apr 2020 21:28) (96% - 99%) on 4LPM via NC      LABS:  cret                        9.5    12.75 )-----------( 89       ( 28 Apr 2020 19:27 )             29.5      (Hg/Hct = 10.5/33.5 at 5:30AM today)     04-28    153<H>  |  118<H>  |  59<H>  ----------------------------<  146<H>  4.6   |  19  |  1.1    Ca    8.0<L>      28 Apr 2020 19:27  Phos  2.8     04-28  Mg     2.6     04-28    TPro  4.8<L>  /  Alb  2.2<L>  /  TBili  0.6  /  DBili  x   /  AST  205<H>  /  ALT  92<H>  /  AlkPhos  137<H>  04-28    PT/INR - ( 28 Apr 2020 19:27 )   PT: 24.10 sec;   INR: 2.10 ratio         PTT - ( 28 Apr 2020 19:27 )  PTT:53.1 sec    < from: Xray Chest 1 View- PORTABLE-Routine (04.28.20 @ 16:28) >  INTERPRETATION:  Clinical History / Reason for exam: Catheter placement.    Comparison : Chest radiograph April 27, 2020; April 25, 2020.    Technique/Positioning: Frontal portable.    Findings:    Support devices: Enteric catheter extends below the hemidiaphragm.    Cardiac/mediastinum/hilum: Aorta is atherosclerotic.    Lung parenchyma/Pleura: Basilar opacities, right greater than left.    Skeleton/soft tissues: Unchanged.    Impression:      Basilar opacifications, right greater than left. Support devices as described.    < end of copied text >    < from: CT Abdomen and Pelvis w/ Oral Cont and w/ IV Cont (04.28.20 @ 11:43) >    IMPRESSION:     1.  Right lower lobe consolidation, compatible with pneumonia.  2.  Distended gallbladder with mild wall edema, nonspecific.  3.  Urinary bladder distended despite presence of Banegas catheter.  4.  Nonspecific endometrial enhancement. Recommend further evaluation with nonemergent outpatient pelvic sonogram as clinically warranted.  5.  Large rectal stool burden.      < end of copied text >    Plan:    monitor labs (including repeat LFT's and Pt/PTT), VS, pulse ox, I's and O's, blood cultures    continue IV fluids and IV ABX    ID follow up    GI to follow up    Discussed with GI fellow, Surgery resident, and Hospitalist Dr. Mitchell

## 2020-04-28 NOTE — CONSULT NOTE ADULT - ATTENDING COMMENTS
89F with a PMH of HTN, type II DM, DLD, CKD stage III, and cognitive impairment admitted for workup and treatment 2/2 gram negative pneumonia, BROOKS, and electrolyte abnormalities now found to have bleeding per rectum and decreasing hgb.    Patient seen and examined    Abdomen  soft non tender non distended    labs reviewed     Plan:   -f/u GI  -Trend Hgb, transfuse PRN   -Recommend conservative management. Patient is a very poor surgical candidate.  -surgery to sign off

## 2020-04-28 NOTE — PROGRESS NOTE ADULT - ASSESSMENT
4y Female PMH of HTN, type II DM, DLD, CKD stage III, and cognitive impairment presented with generalized weakness, decreased oral intake.    # BROOKS / hypernatremia   - BROOKS likely prerenal.    - serum creatinine Na  improving then worse    - non-oliguric   -  cont. 0.45% saline at 100 cc per hour .    - corrected Ca, Mg, phos at goal.   - BP noted. keep MAP > 65   - Hb at goal.   - avoid nephrotoxins and hypotension   - no need for RRT   - will follow

## 2020-04-29 NOTE — PROGRESS NOTE ADULT - SUBJECTIVE AND OBJECTIVE BOX
ANDER TRACEY  89y, Female  Allergy: No Known Allergies      LOS  4d    CHIEF COMPLAINT: Generalized weakness (28 Apr 2020 21:25)      INTERVAL EVENTS/HPI  - melena  - T(F): , Max: 102.4 (04-28-20 @ 17:58)  - WBC Count: 9.29 (04-29-20 @ 04:30)  WBC Count: 12.75 (04-28-20 @ 19:27)  - Creatinine, Serum: 1.0 (04-29-20 @ 04:30)  Creatinine, Serum: 1.1 (04-29-20 @ 00:43)       ROS  unable to obtain history secondary to patient's mental status and/or sedation     VITALS:  T(F): 97.4, Max: 102.4 (04-28-20 @ 17:58)  HR: 84  BP: 92/52  RR: 20Vital Signs Last 24 Hrs  T(C): 36.3 (29 Apr 2020 07:00), Max: 39.1 (28 Apr 2020 17:58)  T(F): 97.4 (29 Apr 2020 07:00), Max: 102.4 (28 Apr 2020 17:58)  HR: 84 (29 Apr 2020 08:31) (83 - 102)  BP: 92/52 (29 Apr 2020 08:31) (86/37 - 106/66)  BP(mean): 72 (29 Apr 2020 08:03) (67 - 72)  RR: 20 (29 Apr 2020 08:31) (16 - 97)  SpO2: 98% (28 Apr 2020 23:56) (96% - 99%)    PHYSICAL EXAM:  Gen: Elderly F  HEENT: Normocephalic, atraumatic  Neck: supple, no lymphadenopathy  CV: Regular rate & regular rhythm  Lungs: decreased BS at bases, no fremitus  Abdomen: Soft, BS present  Ext: Warm, well perfused  Neuro: non focal, not responding to questions  Skin: no rash, no erythema  Lines: no phlebitis    FH: Non-contributory  Social Hx: Non-contributory    TESTS & MEASUREMENTS:                        7.4    9.29  )-----------( 81       ( 29 Apr 2020 04:30 )             23.9     04-29    152<H>  |  122<H>  |  57<H>  ----------------------------<  203<H>  4.3   |  19  |  1.0    Ca    7.2<L>      29 Apr 2020 04:30  Phos  2.4     04-29  Mg     2.3     04-29    TPro  4.1<L>  /  Alb  1.9<L>  /  TBili  0.5  /  DBili  x   /  AST  135<H>  /  ALT  71<H>  /  AlkPhos  81  04-29    eGFR if Non African American: 48 mL/min/1.73M2 (04-29-20 @ 04:30)  eGFR if African American: 56 mL/min/1.73M2 (04-29-20 @ 04:30)  eGFR if Non African American: 43 mL/min/1.73M2 (04-29-20 @ 00:43)  eGFR if : 50 mL/min/1.73M2 (04-29-20 @ 00:43)  eGFR if Non African American: 43 mL/min/1.73M2 (04-28-20 @ 19:27)  eGFR if African American: 50 mL/min/1.73M2 (04-28-20 @ 19:27)    LIVER FUNCTIONS - ( 29 Apr 2020 04:30 )  Alb: 1.9 g/dL / Pro: 4.1 g/dL / ALK PHOS: 81 U/L / ALT: 71 U/L / AST: 135 U/L / GGT: x               Culture - Urine (collected 04-25-20 @ 20:00)  Source: .Urine Catheterized  Final Report (04-27-20 @ 10:09):    No growth    Culture - Blood (collected 04-25-20 @ 16:10)  Source: .Blood Blood  Preliminary Report (04-27-20 @ 01:02):    No growth to date.    Culture - Blood (collected 04-25-20 @ 16:10)  Source: .Blood Blood  Preliminary Report (04-27-20 @ 01:02):    No growth to date.        Lactate, Blood: 6.0 mmol/L (04-26-20 @ 07:31)      INFECTIOUS DISEASES TESTING  COVID-19 PCR: NotDetec (04-25-20 @ 15:28)  Procalcitonin, Serum: 0.23 (04-25-20 @ 15:00)      INFLAMMATORY MARKERS  C-Reactive Protein, Serum: 0.84 mg/dL (04-25-20 @ 15:00)      RADIOLOGY & ADDITIONAL TESTS:  I have personally reviewed the last available Chest xray  CXR  Xray Chest 1 View- PORTABLE-Urgent:   EXAM:  XR CHEST PORTABLE URGENT 1V            PROCEDURE DATE:  04/27/2020            INTERPRETATION:  CLINICAL HISTORY: Tube placement. Hypoxia, leukocytosis.    COMPARISON: 4/25/2020.    TECHNIQUE/POSITIONING: Frontal view of the chest.    FINDINGS:    Support devices: Enteric tube coiled in the stomach.    Cardiac/mediastinum/hilum: Aortic calcifications.    Lung parenchyma/Pleura: New bilateral opacities, right worse than left. Small right pleural effusion.    Skeleton/soft tissues: Severe degenerative change in the shoulders.      IMPRESSION:      1. Enteric tube coiled in the stomach.  2. New bilateral opacities, right worse than left, and small right pleural effusion.                  RENAN BRADEN M.D., ATTENDING RADIOLOGIST  This document has been electronically signed. Apr 27 2020  5:46PM             (04-27-20 @ 17:40)      CT  CT Abdomen and Pelvis w/ Oral Cont and w/ IV Cont:   EXAM:  CT ABDOMEN AND PELVIS OC IC            PROCEDURE DATE:  04/28/2020            INTERPRETATION:  CLINICAL STATEMENT: Weakness. Leukocytosis.    TECHNIQUE: Contiguous axial CT images were obtained from the lower chest to the pubic symphysis following administration of 100cc Optiray 320 intravenous contrast.  Oral contrast was administered.  Reformatted images in the coronal and sagittal planes were acquired.    COMPARISON CT: None.      FINDINGS:    LOWER CHEST: Partially imaged patchy right lower lobe consolidation. Left basilar and right middle lobe atelectasis.  Coronary artery calcifications.    HEPATOBILIARY: Hepatic steatosis. Distended gallbladder with mild wall edema. No biliary ductal dilatation. Scattered hepatic hypodensitiestoo small to characterize.    SPLEEN: Unremarkable.    PANCREAS: Unremarkable.    ADRENAL GLANDS: Unremarkable.    KIDNEYS: Symmetric renal enhancement. No hydronephrosis.    ABDOMINOPELVIC NODES: No lymphadenopathy.    PELVIC ORGANS: Urinary bladderdistended despite presence of Banegas catheter. Intraluminal air likely related to instrumentation. Nonspecific endometrial enhancement (series 4 image 218). Right adnexal calcification.    PERITONEUM/MESENTERY/BOWEL: Large rectal stool burden. Colonicdiverticulosis without evidence of diverticulitis. No evidence of bowel obstruction or pneumoperitoneum. Unremarkable appendix. Enteric tube terminating within the stomach.    BONES/SOFT TISSUES: Diffuse osteopenia. Degenerative changes of the spine.Grade 1 anterolisthesis of L4 and L5.    OTHER: Atherosclerosis of the aorta and its branches.      IMPRESSION:     1.  Right lower lobe consolidation, compatible with pneumonia.  2.  Distended gallbladder with mild wall edema, nonspecific.  3.  Urinary bladder distended despite presence of Banegas catheter.  4.  Nonspecific endometrial enhancement. Recommend further evaluation with nonemergent outpatient pelvic sonogram as clinically warranted.  5.  Large rectal stool burden.              STEVE RICK, RESIDENT RADIOLOGIST  This document has been electronically signed.  SHELTON MCKENZIE M.D., ATTENDING RADIOLOGIST  This document has been electronically signed. Apr 28 2020 12:15PM             (04-28-20 @ 11:43)      CARDIOLOGY TESTING  12 Lead ECG:   Ventricular Rate 98 BPM    Atrial Rate 98 BPM    P-R Interval 110 ms    QRS Duration 70 ms    Q-T Interval 396 ms    QTC Calculation(Bezet) 505 ms    P Axis -28 degrees    R Axis 64 degrees    T Axis 63 degrees    Diagnosis Line Sinus rhythm with short IN  ST & T wave abnormality, consider anterior ischemia  Prolonged QT  Abnormal ECG    Confirmed by JANIE ADAMS, MAXIM ([id number]) on 4/26/2020 12:17:20 AM (04-25-20 @ 16:51)      MEDICATIONS  cefepime   IVPB   cefepime   IVPB 1000  chlorhexidine 4% Liquid 1  dextrose 5% + sodium chloride 0.45%. 1000  glycerin Suppository - Adult 1  iohexol 300 mG (iodine)/mL Oral Solution 30  metroNIDAZOLE  IVPB 500  metroNIDAZOLE  IVPB   mineral oil enema 133  multivitamin  Chewable 1  norepinephrine Infusion 0.02  pantoprazole  Injectable 40  polyethylene glycol 3350 17  potassium chloride   Powder 40  sodium chloride 0.9%. 1000      WEIGHT  Weight (kg): 44 (04-25-20 @ 20:25)  Creatinine, Serum: 1.0 mg/dL (04-29-20 @ 04:30)  Creatinine, Serum: 1.1 mg/dL (04-29-20 @ 00:43)  Creatinine, Serum: 1.1 mg/dL (04-28-20 @ 19:27)      ANTIBIOTICS:  cefepime   IVPB      cefepime   IVPB 1000 milliGRAM(s) IV Intermittent every 24 hours  metroNIDAZOLE  IVPB 500 milliGRAM(s) IV Intermittent every 8 hours  metroNIDAZOLE  IVPB          All available historical records have been reviewed

## 2020-04-29 NOTE — CHART NOTE - NSCHARTNOTEFT_GEN_A_CORE
Called for BP 86 / 43 in patient who is septic and has a GIB  A/P  Hypotension   - start Norepinephrine   -c/w IV fluid for now   -BP monitoring

## 2020-04-29 NOTE — PROGRESS NOTE ADULT - SUBJECTIVE AND OBJECTIVE BOX
Nephrology progress note    Patient was seen and examined, events over the last 24 h noted .    Allergies:  No Known Allergies    Hospital Medications:   MEDICATIONS  (STANDING):  cefepime   IVPB      cefepime   IVPB 1000 milliGRAM(s) IV Intermittent every 24 hours  chlorhexidine 4% Liquid 1 Application(s) Topical <User Schedule>  dextrose 5% + sodium chloride 0.45%. 1000 milliLiter(s) (75 mL/Hr) IV Continuous <Continuous>  glycerin Suppository - Adult 1 Suppository(s) Rectal at bedtime  iohexol 300 mG (iodine)/mL Oral Solution 30 milliLiter(s) Oral once  metroNIDAZOLE  IVPB 500 milliGRAM(s) IV Intermittent every 8 hours  metroNIDAZOLE  IVPB      multivitamin  Chewable 1 Tablet(s) Oral daily  norepinephrine Infusion 0.02 MICROgram(s)/kG/Min (1.65 mL/Hr) IV Continuous <Continuous>  pantoprazole  Injectable 40 milliGRAM(s) IV Push two times a day  phytonadione  IVPB 5 milliGRAM(s) IV Intermittent once  polyethylene glycol 3350 17 Gram(s) Oral two times a day  potassium chloride   Powder 40 milliEquivalent(s) Oral every 4 hours  sodium chloride 0.9%. 1000 milliLiter(s) (500 mL/Hr) IV Continuous <Continuous>        VITALS:  T(F): 97.3 (20 @ 15:00), Max: 102.4 (20 @ 17:58)  HR: 73 (20 @ 15:00)  BP: 104/50 (20 @ 15:00)  RR: 20 (20 @ 15:00)  SpO2: 96% (20 @ 15:00)  Wt(kg): --     @ 07:  -   @ 07:00  --------------------------------------------------------  IN: 0 mL / OUT: 1002 mL / NET: -1002 mL     @ 07:01  -   @ 07:00  --------------------------------------------------------  IN: 0 mL / OUT: 1151 mL / NET: -1151 mL          PHYSICAL EXAM:  Constitutional: NAD  HEENT: anicteric sclera, oropharynx clear, MMM  Neck: No JVD  Respiratory: CTAB, no wheezes, rales or rhonchi  Cardiovascular: S1, S2, RRR  Gastrointestinal: BS+, soft, NT/ND  Extremities: No cyanosis or clubbing. No peripheral edema  :  No bond.   Skin: No rashes    LABS:      152<H>  |  122<H>  |  57<H>  ----------------------------<  203<H>  4.3   |  19  |  1.0    Ca    7.2<L>      2020 04:30  Phos  2.4       Mg     2.3         TPro  4.1<L>  /  Alb  1.9<L>  /  TBili  0.5  /  DBili      /  AST  135<H>  /  ALT  71<H>  /  AlkPhos  81                            7.4    9.29  )-----------( 81       ( 2020 04:30 )             23.9       Urine Studies:  Urinalysis Basic - ( 2020 20:00 )    Color: Yellow / Appearance: Clear / S.023 / pH:   Gluc:  / Ketone: Negative  / Bili: Negative / Urobili: <2 mg/dL   Blood:  / Protein: 100 mg/dL / Nitrite: Negative   Leuk Esterase: Negative / RBC: 2 /HPF / WBC 3 /HPF   Sq Epi:  / Non Sq Epi: 6 /HPF / Bacteria: Negative      Creatinine, Random Urine: 95 mg/dL ( @ 20:00)  Sodium, Random Urine: 25.0 mmoL/L ( @ 20:00)  Osmolality, Random Urine: 608 mos/kg ( @ 20:00)    RADIOLOGY & ADDITIONAL STUDIES:

## 2020-04-29 NOTE — CONSULT NOTE ADULT - ASSESSMENT
89-year-old female with a PMH of HTN, type II DM, DLD, CKD stage III, and cognitive impairment who presented with generalized weakness for the past one week , patient was found to have pneumonia , tested negative for COVID. Patient was also found to have hypernatremia  , and is being treated for both. GI are consulted for melena of one day duration , worsening overnight , associated with  fresh blood per rectum overnight , patient was started on levophed for hypotension. Patient dropped HGB from11.6 baseline to 7.4 today. Patient is not on any antiplatelet or therapeutic anticoagulation.      #melena/ fresh blood per rectum:   r/o upper GI bleed with hemodynamic instability   high BUN to creatinine   drop in HGB 11.6 baseline to 7.4 today  requiring levophed  prolonged INR   Thrombocytopenia     REC:   keep NPO   Transfuse to keep HGB above 8  active type and screen   serial CBC  correct coagulopathy: FFP / VITAMIN K   repeat cbc , INR after FFP / and PRBC  EGD today after optimization    #transaminitis / thrombocytopenia / prolonged INR ( new since presentation)  r/o sepsis induced versus medication induced    REC:   ultrasound liver  avoid hepatotoxic medication  CLD workup if worsening 89-year-old female with a PMH of HTN, type II DM, DLD, CKD stage III, and cognitive impairment who presented with generalized weakness for the past one week , patient was found to have pneumonia , tested negative for COVID. Patient was also found to have hypernatremia  , and is being treated for both. GI are consulted for melena of one day duration , worsening overnight , associated with  fresh blood per rectum overnight , patient was started on levophed for hypotension. Patient dropped HGB from11.6 baseline to 7.4 today. Patient is not on any antiplatelet or therapeutic anticoagulation.      #melena/ fresh blood per rectum:   r/o upper GI bleed with hemodynamic instability   high BUN to creatinine   drop in HGB 11.6 baseline to 7.4 today  requiring levophed  prolonged INR   Thrombocytopenia     REC:   keep NPO   Transfuse to keep HGB above 8  active type and screen   serial CBC  correct coagulopathy: FFP / VITAMIN K   repeat cbc , INR after FFP / and PRBC  EGD today after optimization    #transaminitis / thrombocytopenia / prolonged INR ( new since presentation)  r/o sepsis induced versus medication induced  r/o prolonged INR secondary to malnutrition      REC:   ultrasound liver  avoid hepatotoxic medication  CLD workup if worsening   trend LFTs 89-year-old female with a PMH of HTN, type II DM, DLD, CKD stage III, and cognitive impairment who presented with generalized weakness for the past one week , patient was found to have pneumonia , tested negative for COVID. Patient was also found to have hypernatremia  , and is being treated for both. GI are consulted for melena of one day duration , worsening overnight , associated with  fresh blood per rectum overnight , patient was started on levophed for hypotension. Patient dropped HGB from11.6 baseline to 7.4 today. Patient is not on any antiplatelet or therapeutic anticoagulation.      #melena/ fresh blood per rectum:   r/o upper GI bleed with hemodynamic instability   high BUN to creatinine   drop in HGB 11.6 baseline to 7.4 today  requiring levophed  prolonged INR   Thrombocytopenia     REC:   keep NPO   Transfuse to keep HGB above 8  active type and screen   serial CBC  correct coagulopathy: FFP / VITAMIN K   repeat cbc , INR after FFP / and PRBC  EGD today after optimization  risks and benefits d/w pt    #transaminitis / thrombocytopenia / prolonged INR ( new since presentation)  r/o sepsis induced versus medication induced  r/o prolonged INR secondary to malnutrition  r/o underlying liver cirrhosis      REC:   ultrasound liver and doppler of PV  avoid hepatotoxic medication  CLD workup if worsening

## 2020-04-29 NOTE — OCCUPATIONAL THERAPY INITIAL EVALUATION ADULT - SPECIFY REASON(S)
As per MD, pt is medically unstable and is being treated as acute with therapy tx on hold. OT will follow up with pt when medically appropriate.

## 2020-04-29 NOTE — PROGRESS NOTE ADULT - ASSESSMENT
[facility]PARTMENT ENCOUNTER    Basic Information  Name: Sandhya Allen Age: 89 year old Sex: female   MRN: [id number] Encounter: 1/20/2020, 0225  PCP: Lakshmi Hewitt MD    Written by Janette Staples, acting as a medical scribe for Dr. Ana Laura Harp MD.     Chief Complaint  Chief Complaint   Patient presents with   • Fever 75 years or more       History of Present Illness    89 yo f with pmhx pacemaker presents to ED accompanied by family for evaluation of abdominal pain that began today. Family reports pt has been feeling unwell the past few days with dry cough, chills, vomiting, diarrhea. Son states today pt developed fever and worsening sx, prompting them to bring her to ED for further evaluation. Son reports decreased appetite and po intake. Pt c/o lower abdominal pain. Pt has hx of hysterectomy, appendectomy. Pt has not had congestion, difficulty urinating.    Health Status  Allergies:  ALLERGIES:  No Known Allergies    Current Medications:  Mobic, paroxetine, januvia, amlodipine/hydrochlorothiazide    Past Medical History    Past Medical History:   Diagnosis Date   • Diabetes mellitus (CMS/HCC)    • Essential (primary) hypertension        Surgical History:  Hysterectomy, appendectomy      Family History:  No inherited conditions reported.      Social History     Tobacco Use   • Smoking status: Denies   Substance Use Topics   • Alcohol use: Denies   • Drug use: Denies       Review of Systems  Pt denies any chest pain or SOB. Pt denies any rhinorrhea. Pt denies any headaches, neck pain, weakness/numbness/tingling, blurred/double vision, vertigo. Pt denies any back pain, urinary symptoms, and melena/hematochezia.   ROS reviewed as documented in chart.       Physical Exam  Time: 0225  ED Triage Vitals [01/20/20 0228]   /65   Pulse 101   Resp 18   Temp (!) 102.9 °F (39.4 °C)   SpO2 92 %     General:  No acute distress. Alert. Ill appearing  Skin:  Warm. Dry. Intact.  Head:  Normocephalic.  Atraumatic.  Eye:  PERRL. EOMI. Normal conjunctiva. No icteric.  ENMT:  Dry mucous membranes.  Neck: Full ROM. Trachea midline, supple, no jvd  Cardiovascular:  Tachycardic. No murmur. Normal peripheral perfusion. No edema.  Respiratory:  Tachypneic. Crackles in lower lung fields. No wheezing, no distress  Gastrointestinal:  Soft. Nontender. Non distended.  Guarding: negative  Rebound: negative  Musculoskeletal:  Normal ROM. No joint swelling. No deformity.  Neurological:  A/O x 4. No focal neurological deficit observed. Normal sensory. Normal motor. Normal speech.  Psychiatric: Cooperative. Appropriate mood and affect.      Medical Decision Making  Orders:  Medications   sodium chloride (NORMAL SALINE) 0.9 % bolus 1,000 mL (1,000 mLs Intravenous New Bag 1/20/20 0304)   magnesium sulfate 2 g in 50 mL premix IVPB (2 g Intravenous New Bag 1/20/20 0353)   acetaminophen (TYLENOL) tablet 1,000 mg (1,000 mg Oral Given 1/20/20 0303)   ondansetron (ZOFRAN) injection 4 mg (4 mg Intravenous Given 1/20/20 0305)       EKG:  Time: 0245  Rate: 97  Rhythm: paced   EKG Interpretation    EKG interpreted by ED physician.     Laboratory Results:  Results for orders placed or performed during the hospital encounter of 01/20/20   CBC with Automated Differential   Result Value Ref Range    WBC 15.4 (H) 4.2 - 11.0 K/mcL    RBC 3.78 (L) 4.00 - 5.20 mil/mcL    HGB 12.2 12.0 - 15.5 g/dL    HCT 35.1 (L) 36.0 - 46.5 %    MCV 92.9 78.0 - 100.0 fl    MCH 32.3 26.0 - 34.0 pg    MCHC 34.8 32.0 - 36.5 g/dL    RDW-CV 13.8 11.0 - 15.0 %     (L) 140 - 450 K/mcL    NRBC 0 0 /100 WBC    DIFF TYPE AUTOMATED DIFFERENTIAL     Neutrophil 88 %    LYMPH 4 %    MONO 8 %    EOSIN 0 %    BASO 0 %    Percent Immature Granuloctyes 0 %    Absolute Neutrophil 13.5 (H) 1.8 - 7.7 K/mcL    Absolute Lymph 0.5 (L) 1.0 - 4.0 K/mcL    Absolute Mono 1.2 (H) 0.3 - 0.9 K/mcL    Absolute Eos 0.0 (L) 0.1 - 0.5 K/mcL    Absolute Baso 0.1 0.0 - 0.3 K/mcL    Absolute  ASSESSMENT   89-year-old female with a PMH of HTN, type II DM, DLD, CKD stage III, and cognitive impairment who presented with generalized weakness for the past one week    IMPRESSION  #RLL PNA possible aspiration    CXR New bilateral opacities, right worse than left, and small right pleural effusion. (not present on admission 4/25 CXR )    Tm 100.6, Severe sepsis on admission P>90 WBC >12, BROOKS    4/25 UCX NGTD     4/25 BCX NGTD     COVID-19 PCR: NotDetec (04-25-20 @ 15:28)    Procalcitonin, Serum: 0.23 ng/mL (04-25-20 @ 15:00)  #Melena  #Hypernatremia  #Elevated Trop BNP  #Transaminitis     TBili  0.7  /  DBili  x   /  AST  113<H>  /  ALT  75<H>  /  AlkPhos  97 4/27  #Thrombocytopenia  #QTC prolongation QTC Calculation(Bezet) 505 ms      RECOMMENDATIONS  - Repeat BCX given fever  - MRSA nares   - Continue cefepime/flagyl IV. If hemodynamic compromise, broaden to Jocelyne 500mg q12h IV  - Send urine Ag for Strep and Legionella   - Send sputum cx if possible  - Grave prognosis. [facility]    Spectra 9610 Immature Granulocytes 0.1 0 - 0.2 K/mcl   COMPREHENSIVE METABOLIC PANEL   Result Value Ref Range    Sodium 133 (L) 135 - 145 mmol/L    Potassium 3.4 3.4 - 5.1 mmol/L    Chloride 97 (L) 98 - 107 mmol/L    Carbon Dioxide 26 21 - 32 mmol/L    Anion Gap 13 10 - 20 mmol/L    Glucose 179 (H) 65 - 99 mg/dL    BUN 24 (H) 6 - 20 mg/dL    Creatinine 1.12 (H) 0.51 - 0.95 mg/dL    GFR Estimate,  50     GFR Estimate, Non African American 43     BUN/Creatinine Ratio 21 7 - 25    CALCIUM 8.4 8.4 - 10.2 mg/dL    TOTAL BILIRUBIN 0.6 0.2 - 1.0 mg/dL    AST/SGOT 25 <38 Units/L    ALT/SGPT 34 <64 Units/L    ALK PHOSPHATASE 65 45 - 117 Units/L    TOTAL PROTEIN 7.1 6.4 - 8.2 g/dL    Albumin 3.4 (L) 3.6 - 5.1 g/dL    GLOBULIN 3.7 2.0 - 4.0 g/dL    A/G Ratio, Serum 0.9 (L) 1.0 - 2.4   Lipase   Result Value Ref Range    Lipase 112 73 - 393 Units/L   Magnesium   Result Value Ref Range    MAGNESIUM 1.6 (L) 1.7 - 2.4 mg/dL   Troponin I Ultra Sensitive   Result Value Ref Range    TROPONIN I 0.05 (HH) <0.05 ng/mL   Rapid Influenza A/B by PCR   Result Value Ref Range    Specimen Source NASOPHARYNGEAL SWAB     Influenza A Virus NOT DETECTED NOT DETECTED    Influenza B Virus NOT DETECTED NOT DETECTED   URINALYSIS & REFLEX MICRO WITH CULTURE IF INDICATED   Result Value Ref Range    SPECIMEN TYPE URINE CLEAN CATCH     COLOR HARVINDER (A) YELLOW    APPEARANCE HAZY     GLUCOSE(URINE) NEGATIVE NEGATIVE mg/dL    BILIRUBIN NEGATIVE NEGATIVE    KETONES NEGATIVE NEGATIVE mg/dL    SPECIFIC GRAVITY 1.017 1.005 - 1.030    BLOOD MODERATE (A) NEGATIVE    pH 5.0 5.0 - 7.0 Units    PROTEIN(URINE) 100 (A) NEGATIVE mg/dL    UROBILINOGEN 0.2 0.0 - 1.0 mg/dL    NITRITE NEGATIVE NEGATIVE    LEUKOCYTE ESTERASE MODERATE (A) NEGATIVE    Squamous EPI'S 1 to 5 0 - 5 /hpf    TRANSITIONAL EPI'S 1 to 5 /hpf    RBC 6 to 10 0 - 2 /hpf    WBC 26 to 100 0 - 5 /hpf    BACTERIA FEW (A) NONE SEEN /hpf    Hyaline Casts 6 to 10 0 - 5 /lpf       Radiology Results:  XR CHEST  PA OR AP 1 VIEW    (Results Pending)   CT ABDOMEN PELVIS WO CONTRAST    (Results Pending)   Preliminary Read by ER physician.  XR (chest)  Date/Time: 1/20/2020 / 0334  Impression: no focal infiltrate      MDM:  Abdominal exam is wo focal tenderness. She was febrile, given antipyretics, antiemetics and IVF. Labs show leukocytosis, flu swab negative. Given vomiting/dehydration patient will be admitted - results of CT scan abdomen endorsed to Dr. Skelton.     ED Course  Vitals:    01/20/20 0228 01/20/20 0328   BP: 166/65 132/59   Pulse: 101 88   Resp: 18    Temp: (!) 102.9 °F (39.4 °C)    TempSrc: Oral    SpO2: 92% 94%   Weight: 74.5 kg (164 lb 3.9 oz)        ED Course as of Jan 20 0356   Mon Jan 20, 2020 0345 Reviewed diagnostic results with the pt's family, as well as plan to stay. Pt's family understands and agrees to stay for further workup and management.      0353 Case discussed with Dr. Thacker, [facility], who has accepted patient. Will place patient in inpatient status.      0355 Pt remained stable under my care. Pt is in no distress. Pt care transitioned to Dr. Raeann Skelton DO, pending CT .            Impression and Plan  Diagnosis:  1. Dehydration    2. Elevated troponin    3. Fever, unspecified fever cause    4. Vomiting and diarrhea        Condition:  STABLE      Disposition:  Time: 0355  The patient's care was turned over to the on coming ED staff at shift change.  The sign-out included discussion of the details of the HPI, physical exam, the working diagnosis, the patient's clinical status, treatments provided, completed and pending test results.      Placed in INPATIENT STATUS WITH TELEMETRY  under Dr. Thacker . and Patient care transitioned to Dr. Raeann Skelton DO  pending CT .      Counseled:  Patient, Family, Regarding diagnosis, Regarding diagnostic results, Regarding treatment, Patient understood and Family understood      This document serves as a record of the services and decisions  personally performed and made by Dr. Ana Laura Harp MD. It was created on the provider's behalf by Janette Staples, a trained medical scribe. The creation of this document is based on the provider's statements to the medical scribe.    Janette Staples, 1/20/2020, 0400      .The information in this document, created by the medical scribe for me Ana Laura Harp MD , accurately reflects the services I personally performed and the decisions made by me. I have reviewed and approved this document for accuracy prior to leaving the patient care area.    Ana Laura Harp MD, 1/21/2020  2:34 PM                   Ana Laura Harp MD  01/21/20 4012

## 2020-04-29 NOTE — CONSULT NOTE ADULT - SUBJECTIVE AND OBJECTIVE BOX
Gastroenterology Consultation:    Patient is a 89y old  Female who presents with a chief complaint of Generalized weakness (29 Apr 2020 09:20)      Admitted on: 04-25-20  HPI:  The patient is a 89-year-old female with a PMH of HTN, type II DM, DLD, CKD stage III, and cognitive impairment who presented with generalized weakness for the past one week.  History was obtained from the patient's family members over the telephone due to the patient's mental status.  She has had decreased oral intake over the past 3-4 days.  At her baseline, the patient is non-verbal and ambulates with assistance.  However over the past one week she's been mostly bedbound.  The patient had an episode of vomiting today.  She also appeared to have labored breathing per family today.  Otherwise, the patient reportedly did not have fever, chills, abdominal pain, bowel or urinary complaints.  The patient's family denied any recent sick contacts. (25 Apr 2020 18:44).   GI history  :   89-year-old female with a PMH of HTN, type II DM, DLD, CKD stage III, and cognitive impairment who presented with generalized weakness for the past one week , patient was found to have pneumonia , tested negative for COVID. Patient was also found to have hypernatremia  , and is being treated for both. GI are consulted for melena of one day duration , worsening overnight , associated with  fresh blood per rectum overnight , patient was started on levophed for hypotension. Patient dropped HGB from11.6 baseline to 7.4 today. Patient is not on any antiplatelet or therapeutic anticoagulation.        Prior records Reviewed (Y/N): Y  History obtained from person other than patient (Y/N): Y    Prior EGD: none in chart   Prior Colonoscopy: none in chart       PAST MEDICAL & SURGICAL HISTORY:  High cholesterol  Diabetes  Advanced dementia  No pertinent past medical history  S/P cataract surgery      FAMILY HISTORY:  No pertinent family history in first degree relatives      Social History:  Tobacco:  unable to obtain  Alcohol:  unable to obtain   Drugs: unable to obtain     Home Medications:  atenolol 50 mg oral tablet: 1 tab(s) orally once a day (25 Apr 2020 20:01)  atorvastatin 20 mg oral tablet: 1 tab(s) orally once a day (at bedtime) (25 Apr 2020 20:01)  metFORMIN 500 mg oral tablet: 1 tab(s) orally 2 times a day (25 Apr 2020 20:01)    MEDICATIONS  (STANDING):  cefepime   IVPB      cefepime   IVPB 1000 milliGRAM(s) IV Intermittent every 24 hours  chlorhexidine 4% Liquid 1 Application(s) Topical <User Schedule>  dextrose 5% + sodium chloride 0.45%. 1000 milliLiter(s) (75 mL/Hr) IV Continuous <Continuous>  glycerin Suppository - Adult 1 Suppository(s) Rectal at bedtime  iohexol 300 mG (iodine)/mL Oral Solution 30 milliLiter(s) Oral once  metroNIDAZOLE  IVPB 500 milliGRAM(s) IV Intermittent every 8 hours  metroNIDAZOLE  IVPB      multivitamin  Chewable 1 Tablet(s) Oral daily  norepinephrine Infusion 0.02 MICROgram(s)/kG/Min (1.65 mL/Hr) IV Continuous <Continuous>  pantoprazole  Injectable 40 milliGRAM(s) IV Push two times a day  phytonadione  IVPB 5 milliGRAM(s) IV Intermittent once  polyethylene glycol 3350 17 Gram(s) Oral two times a day  potassium chloride   Powder 40 milliEquivalent(s) Oral every 4 hours  sodium chloride 0.9%. 1000 milliLiter(s) (500 mL/Hr) IV Continuous <Continuous>    MEDICATIONS  (PRN):  acetaminophen    Suspension .. 650 milliGRAM(s) Enteral Tube every 6 hours PRN Temp greater or equal to 38C (100.4F)  acetaminophen  Suppository .. 650 milliGRAM(s) Rectal every 6 hours PRN Temp greater or equal to 38C (100.4F)  bisacodyl Suppository 10 milliGRAM(s) Rectal <User Schedule> PRN Constipation      Allergies  No Known Allergies      Review of Systems:   unable to obtain secondary to advanced dementia           Physical Examination:  T(C): 36.3 (04-29-20 @ 07:00), Max: 39.1 (04-28-20 @ 17:58)  HR: 89 (04-29-20 @ 12:15) (83 - 102)  BP: 105/50 (04-29-20 @ 12:15) (86/37 - 106/66)  RR: 20 (04-29-20 @ 12:15) (16 - 20)  SpO2: 99% (04-29-20 @ 12:15) (96% - 100%)      04-27-20 @ 07:01  -  04-28-20 @ 07:00  --------------------------------------------------------  IN: 0 mL / OUT: 1002 mL / NET: -1002 mL    04-28-20 @ 07:01  -  04-29-20 @ 07:00  --------------------------------------------------------  IN: 0 mL / OUT: 1151 mL / NET: -1151 mL        Constitutional: No acute distress.  Eyes:. Conjunctivae are clear, Sclera is non-icteric.  Ears Nose and Throat: The external ears are normal appearing,  Oral mucosa is pink and moist.  Respiratory:  No signs of respiratory distress. Lung sounds are clear bilaterally.  Cardiovascular:  S1 S2, Regular rate and rhythm.  GI: Abdomen is soft, symmetric, and non-tender without distention. Bowel sounds are present and normoactive in all four quadrants. No masses, hepatomegaly, or splenomegaly are noted.   Rectal exam: positive for melena and fresh blood per rectum   Neuro: No Tremor, No involuntary movements  Skin: No rashes, No Jaundice.          Data: (reviewed by attending)                        7.4    9.29  )-----------( 81       ( 29 Apr 2020 04:30 )             23.9     Hgb Trend:  7.4  04-29-20 @ 04:30  9.5  04-28-20 @ 19:27  10.5  04-28-20 @ 05:43  11.6  04-27-20 @ 13:50  14.1  04-26-20 @ 18:18      04-28-20 @ 07:01  -  04-29-20 @ 07:00  --------------------------------------------------------  IN: 0 mL      04-29    152<H>  |  122<H>  |  57<H>  ----------------------------<  203<H>  4.3   |  19  |  1.0    Ca    7.2<L>      29 Apr 2020 04:30  Phos  2.4     04-29  Mg     2.3     04-29    TPro  4.1<L>  /  Alb  1.9<L>  /  TBili  0.5  /  DBili  x   /  AST  135<H>  /  ALT  71<H>  /  AlkPhos  81  04-29    Liver panel trend:  TBili 0.5   /      /   ALT 71   /   AlkP 81   /   Tptn 4.1   /   Alb 1.9    /   DBili --      04-29  TBili 0.6   /      /   ALT 77   /   AlkP 97   /   Tptn 4.2   /   Alb 1.9    /   DBili --      04-29  TBili 0.6   /      /   ALT 92   /   AlkP 137   /   Tptn 4.8   /   Alb 2.2    /   DBili --      04-28  TBili 0.6   /      /   ALT 73   /   AlkP 121   /   Tptn 4.9   /   Alb 2.2    /   DBili --      04-28  TBili 0.7   /      /   ALT 75   /   AlkP 97   /   Tptn 5.1   /   Alb 2.5    /   DBili --      04-27  TBili 1.1   /      /   ALT 95   /   AlkP 83   /   Tptn 5.6   /   Alb 2.9    /   DBili --      04-26  TBili 0.6   /      /      /   AlkP 76   /   Tptn 5.8   /   Alb 2.8    /   DBili --      04-26  TBili 0.6   /      /      /   AlkP 79   /   Tptn 5.9   /   Alb 3.1    /   DBili --      04-25  TBili 0.7   /      /      /   AlkP 108   /   Tptn 7.9   /   Alb 4.1    /   DBili --      04-25      PT/INR - ( 29 Apr 2020 04:30 )   PT: 26.70 sec;   INR: 2.32 ratio         PTT - ( 29 Apr 2020 04:30 )  PTT:48.1 sec        Radiology:(reviewed by attending)

## 2020-04-29 NOTE — PROGRESS NOTE ADULT - ASSESSMENT
4y Female PMH of HTN, type II DM, DLD, CKD stage III, and cognitive impairment presented with generalized weakness, decreased oral intake.    # BROOKS / hypernatremia   - BROOKS likely prerenal.    - serum creatinine Na  improving then worse    - non-oliguric   -  change NS to D5W   - corrected Ca, Mg, phos at goal.   - BP noted. keep MAP > 65   - Hb at goal.   - avoid nephrotoxins and hypotension   - no need for RRT   - will follow

## 2020-04-29 NOTE — PROGRESS NOTE ADULT - SUBJECTIVE AND OBJECTIVE BOX
Pt seen and examined at bedside. unable to get ros. Pt stupeorous    PAST MEDICAL & SURGICAL HISTORY:  High cholesterol  Diabetes  Advanced dementia  S/P cataract surgery    VITAL SIGNS (Last 24 hrs):  T(C): 36.4 (04-28-20 @ 08:00), Max: 37.6 (04-28-20 @ 00:17)  HR: 86 (04-28-20 @ 08:00) (86 - 94)  BP: 97/55 (04-28-20 @ 08:00) (97/55 - 108/55)  RR: 97 (04-28-20 @ 12:48) (18 - 97)  SpO2: 96% (04-28-20 @ 00:17) (96% - 96%)    I&O's Summary    27 Apr 2020 07:01  -  28 Apr 2020 07:00  --------------------------------------------------------  IN: 0 mL / OUT: 1002 mL / NET: -1002 mL    28 Apr 2020 07:01  -  28 Apr 2020 13:48  --------------------------------------------------------  IN: 0 mL / OUT: 300 mL / NET: -300 mL      PHYSICAL EXAM:  GENERAL: NAD, well-developed  HEAD:  Atraumatic, Normocephalic  EYES: EOMI, PERRLA, conjunctiva and sclera clear  NECK: Supple, No JVD  CHEST/LUNG: Clear to auscultation bilaterally; No wheeze  HEART: Regular rate and rhythm; No murmurs, rubs, or gallops  ABDOMEN: Soft, Nontender, Nondistended; Bowel sounds present  EXTREMITIES:  2+ Peripheral Pulses, No clubbing, cyanosis, or edema  PSYCH: stuperous  NEUROLOGY: non-focal  SKIN: No rashes or lesions    LABS                        7.4    9.29  )-----------( 81       ( 29 Apr 2020 04:30 )             23.9     04-29    152<H>  |  122<H>  |  57<H>  ----------------------------<  203<H>  4.3   |  19  |  1.0    Ca    7.2<L>      29 Apr 2020 04:30  Phos  2.4     04-29  Mg     2.3     04-29    TPro  4.1<L>  /  Alb  1.9<L>  /  TBili  0.5  /  DBili  x   /  AST  135<H>  /  ALT  71<H>  /  AlkPhos  81  04-29    Monocyte # : 0.23 K/uL    Imaging reviewed:   < from: CT Abdomen and Pelvis w/ Oral Cont and w/ IV Cont (04.28.20 @ 11:43) >  IMPRESSION:     1.  Right lower lobe consolidation, compatible with pneumonia.  2.  Distended gallbladder with mild wall edema, nonspecific.  3.  Urinary bladder distended despite presence of Banegas catheter.  4.  Nonspecific endometrial enhancement. Recommend further evaluation with nonemergent outpatient pelvic sonogram as clinically warranted.  5.  Large rectal stool burden.    < end of copied text >      < from: Xray Chest 1 View- PORTABLE-Urgent (04.27.20 @ 17:40) >    IMPRESSION:      1. Enteric tube coiled in the stomach.  2. New bilateral opacities, right worse than left, and small right pleural effusion.    < end of copied text >    MEDICATIONS  (STANDING):     Cefepime   IVPB 1000 milliGRAM(s) IV Intermittent every 24 hours  chlorhexidine 4% Liquid 1 Application(s) Topical <User Schedule>  dextrose 5% + sodium chloride 0.45%. 1000 milliLiter(s) (75 mL/Hr) IV Continuous <Continuous>  glycerin Suppository - Adult 1 Suppository(s) Rectal at bedtime  iohexol 300 mG (iodine)/mL Oral Solution 30 milliLiter(s) Oral once  MetroNIDAZOLE  IVPB 500 milliGRAM(s) IV Intermittent every 8 hours      multivitamin  Chewable 1 Tablet(s) Oral daily  norepinephrine Infusion 0.02 MICROgram(s)/kG/Min (1.65 mL/Hr) IV Continuous   pantoprazole  Injectable 40 milliGRAM(s) IV Push two times a day  phytonadione  IVPB 5 milliGRAM(s) IV Intermittent once  polyethylene glycol 3350 17 Gram(s) Oral two times a day  potassium chloride   Powder 40 milliEquivalent(s) Oral every 4 hours  sodium chloride 0.9%. 1000 milliLiter(s) (500 mL/Hr) IV Continuous     MEDICATIONS  (PRN):  acetaminophen    Suspension .. 650 milliGRAM(s) Enteral Tube every 6 hours PRN Temp greater or equal to 38C (100.4F)  acetaminophen  Suppository .. 650 milliGRAM(s) Rectal every 6 hours PRN Temp greater or equal to 38C (100.4F)  bisacodyl Suppository 10 milliGRAM(s) Rectal <User Schedule> PRN Constipation

## 2020-04-29 NOTE — PROGRESS NOTE ADULT - SUBJECTIVE AND OBJECTIVE BOX
The patient is a 89-year-old female with a PMH of HTN, type II DM, DLD, CKD stage III, and cognitive impairment due to severe advanced dementia, who presented on 4/25/2020 with generalized weakness for the past one week.  History was obtained from the patient's family members over the telephone due to the patient's mental status.  She has had decreased oral intake over the past 3-4 days.  At her baseline, the patient is non-verbal and ambulates with assistance.  However over the past one week she's been mostly bedbound.  The patient had an episode of vomiting today.  She also appeared to have labored breathing per family today.  Otherwise, the patient reportedly did not have fever, chills, abdominal pain, bowel or urinary complaints.  The patient's family denied any recent sick contacts. (25 Apr 2020 18:44).  She tested negative for covid on 4/25/2020.    She was extremely dehydrated when admitted, BUN /CREAT = 117/2.8, Na+=174; she was given iv fluids D5W then D51/2 NS and labs are improving slowly.  BUN decreased to 69 this AM and Na+ decreased to 158.    An ng tube was placed on 4/27 due to patient could not eat because of severe dementia and lethargy, and also to give her free water.  CXR 4/27 and 4/28 showed b/l infiltrates R>L.  She was seen by ID and started IV cefepime and IV flagyl for pneumonia, she also had CT abdomen and pelvis today with po and IV contrast, to r/o   abscess. Urine for Strep pneumonia and Legionella was also ordered. When she was checked by the nursing staff yesterday afternoon, she was found to be covered in blood. When she was cleaned up, the blood was seen to  be coming from the rectum. It is dark maroon with large clots. She is still passing maroon stool but is comfortable. She also spiked a fever of 102.4. currently afebrile.  The rapid response nursing team placed two 18-gauge iv lines. IV Protonix 40mg q12h was started as per GI, and SC heparin was stopped.     GI and Surgery were called, Surgery saw her and examined her and said it was not a surgical issue,    Early this am her blood pressure was very low and she was started on a levophed drip by the hospitalist overnight. BP 80's/50's this am now 105/50  The patient is DNR/DNI, GI saw the patient this morning , on examination she was still having melena/fresh blood per rectum . Labs reviewed - this am h/h 7.4/23 , INR 2.32 -not on anticoagulants, 1 U FFP ordered stat, Jocelyn K , 1U PRBC stat,1U PRBC on hold, for egd today once ffp and blood given.  - LIver ultrasound requested     ID requests repeat blood cultures, MRSA nares Urine Ag for strep and legionella - ordered     Called daughter - spoke with grandson who translated , discussed mothers current status and she was made aware of all issues currently. [facility] [place] was contacted to allow the daughter to visit her mother as her condition is worsening .       Vital Signs Last 24 Hrs  T(C): 36.3 (29 Apr 2020 07:00), Max: 39.1 (28 Apr 2020 17:58)  T(F): 97.4 (29 Apr 2020 07:00), Max: 102.4 (28 Apr 2020 17:58)  HR: 89 (29 Apr 2020 12:15) (83 - 102)  BP: 105/50 (29 Apr 2020 12:15) (86/37 - 106/66)  BP(mean): 69 (29 Apr 2020 09:27) (67 - 72)  RR: 20 (29 Apr 2020 12:15) (16 - 20)  SpO2: 99% (29 Apr 2020 12:15) (96% - 100%)                        7.4    9.29  )-----------( 81       ( 29 Apr 2020 04:30 )             23.9       04-29    152<H>  |  122<H>  |  57<H>  ----------------------------<  203<H>  4.3   |  19  |  1.0    Ca    7.2<L>      29 Apr 2020 04:30  Phos  2.4     04-29  Mg     2.3     04-29    TPro  4.1<L>  /  Alb  1.9<L>  /  TBili  0.5  /  DBili  x   /  AST  135<H>  /  ALT  71<H>  /  AlkPhos  81  04-29      PT/INR - ( 29 Apr 2020 04:30 )   PT: 26.70 sec;   INR: 2.32 ratio         PTT - ( 29 Apr 2020 04:30 )  PTT:48.1 sec            Plan:    GI for EGD today     FFP/ PRBC     am cbc, INR, CMP    liver ultrasound     f/u blood cultures , urine Ag for strep and legionella     case discussed with hospitalist The patient is a 89-year-old female with a PMH of HTN, type II DM, DLD, CKD stage III, and cognitive impairment due to severe advanced dementia, who presented on 4/25/2020 with generalized weakness for the past one week.  History was obtained from the patient's family members over the telephone due to the patient's mental status.  She has had decreased oral intake over the past 3-4 days.  At her baseline, the patient is non-verbal and ambulates with assistance.  However over the past one week she's been mostly bedbound.  The patient had an episode of vomiting today.  She also appeared to have labored breathing per family today.  Otherwise, the patient reportedly did not have fever, chills, abdominal pain, bowel or urinary complaints.  The patient's family denied any recent sick contacts. (25 Apr 2020 18:44).  She tested negative for covid on 4/25/2020.    She was extremely dehydrated when admitted, BUN /CREAT = 117/2.8, Na+=174; she was given iv fluids D5W then D51/2 NS and labs are improving slowly.  BUN decreased to 69 this AM and Na+ decreased to 158.    An ng tube was placed on 4/27 due to patient could not eat because of severe dementia and lethargy, and also to give her free water.  CXR 4/27 and 4/28 showed b/l infiltrates R>L.  She was seen by ID and started IV cefepime and IV flagyl for pneumonia, she also had CT abdomen and pelvis today with po and IV contrast, to r/o   abscess. Urine for Strep pneumonia and Legionella was also ordered. When she was checked by the nursing staff yesterday afternoon, she was found to be covered in blood. When she was cleaned up, the blood was seen to  be coming from the rectum. It is dark maroon with large clots. She is still passing maroon stool but is comfortable. She also spiked a fever of 102.4. currently afebrile.  The rapid response nursing team placed two 18-gauge iv lines. IV Protonix 40mg q12h was started as per GI, and SC heparin was stopped.     GI and Surgery were called, Surgery saw her and examined her and said it was not a surgical issue,    Early this am her blood pressure was very low and she was started on a levophed drip by the hospitalist overnight. BP 80's/50's this am now 105/50  The patient is DNR/DNI, GI saw the patient this morning , on examination she was still having melena/fresh blood per rectum . Labs reviewed - this am h/h 7.4/23 , INR 2.32 -not on anticoagulants, 1 U FFP ordered stat, Jocelyn K , 1U PRBC stat,1U PRBC on hold, for egd today once ffp and blood given.  - LIver ultrasound requested     ID requests repeat blood cultures, MRSA nares Urine Ag for strep and legionella - ordered     Called daughter - spoke with grandson who translated , discussed mothers current status and she was made aware of all issues currently. [facility] [place] was contacted to allow the daughter to visit her mother as her condition is worsening .       Vital Signs Last 24 Hrs  T(C): 36.3 (29 Apr 2020 07:00), Max: 39.1 (28 Apr 2020 17:58)  T(F): 97.4 (29 Apr 2020 07:00), Max: 102.4 (28 Apr 2020 17:58)  HR: 89 (29 Apr 2020 12:15) (83 - 102)  BP: 105/50 (29 Apr 2020 12:15) (86/37 - 106/66)  BP(mean): 69 (29 Apr 2020 09:27) (67 - 72)  RR: 20 (29 Apr 2020 12:15) (16 - 20)  SpO2: 99% (29 Apr 2020 12:15) (96% - 100%)                        7.4    9.29  )-----------( 81       ( 29 Apr 2020 04:30 )             23.9       04-29    152<H>  |  122<H>  |  57<H>  ----------------------------<  203<H>  4.3   |  19  |  1.0    Ca    7.2<L>      29 Apr 2020 04:30  Phos  2.4     04-29  Mg     2.3     04-29    TPro  4.1<L>  /  Alb  1.9<L>  /  TBili  0.5  /  DBili  x   /  AST  135<H>  /  ALT  71<H>  /  AlkPhos  81  04-29      PT/INR - ( 29 Apr 2020 04:30 )   PT: 26.70 sec;   INR: 2.32 ratio         PTT - ( 29 Apr 2020 04:30 )  PTT:48.1 sec            Plan:    GI for EGD today     FFP/ PRBC , Lasix 20mg IVP x1     am cbc, INR, CMP    liver ultrasound     f/u blood cultures , urine Ag for strep and legionella     case discussed with hospitalist

## 2020-04-29 NOTE — CHART NOTE - NSCHARTNOTEFT_GEN_A_CORE
Upon Nutritional Assessment by the Registered Dietitian your patient was determined to meet criteria / has evidence of the following diagnosis/diagnoses:          [ ]  Mild Protein Calorie Malnutrition        [x ]  Moderate Protein Calorie Malnutrition 8.5% wt loss in 2 months and <75% of estimated energy requirement for > 7 days        [ ] Severe Protein Calorie Malnutrition        [ ] Unspecified Protein Calorie Malnutrition        [ x] Underweight / BMI <19        [ ] Morbid Obesity / BMI > 40    Ht- 152.4cm, Wt- 44 kg, BMI- 18.9    Findings as based on:  •  Comprehensive nutrition assessment and consultation  •  Intervention secondary to interdisciplinary rounds  •   concerns      Treatment:    The following diet has been recommended: Consider Jevity 1.2 @120ml q6h for the first 24-48h and monitor electrolytes closely. If tolerated advance to goal rate of 240ml q6h for 1158kcal, 53g protein, 778ml free H2O (100% est calorie needs, 100% est protein needs).        PROVIDER Section:     By signing this assessment you are acknowledging and agree with the diagnosis/diagnoses assigned by the Registered Dietitian    Comments: Upon Nutritional Assessment by the Registered Dietitian your patient was determined to meet criteria / has evidence of the following diagnosis/diagnoses:          [ ]  Mild Protein Calorie Malnutrition        [x ]  Moderate Protein Calorie Malnutrition in the context of acute illness 8.5% wt loss in 2 months and <75% of estimated energy requirement for > 7 days        [ ] Severe Protein Calorie Malnutrition        [ ] Unspecified Protein Calorie Malnutrition        [ x] Underweight / BMI <19        [ ] Morbid Obesity / BMI > 40    Ht- 152.4cm, Wt- 44 kg, BMI- 18.9    Findings as based on:  •  Comprehensive nutrition assessment and consultation  •  Intervention secondary to interdisciplinary rounds  •   concerns      Treatment:    The following diet has been recommended: Consider Jevity 1.2 @120ml q6h for the first 24-48h and monitor electrolytes closely. If tolerated advance to goal rate of 240ml q6h for 1158kcal, 53g protein, 778ml free H2O (100% est calorie needs, 100% est protein needs).        PROVIDER Section:     By signing this assessment you are acknowledging and agree with the diagnosis/diagnoses assigned by the Registered Dietitian    Comments:

## 2020-04-29 NOTE — PROGRESS NOTE ADULT - ASSESSMENT
A 89-year-old female with a PMH of HTN, type II DM, DLD, CKD stage III, and cognitive impairment who presented with generalized weakness for the past one week and decreased oral intake.    1. Generalized weakness 2/2 suspected GN pneumonia / Aspiration PNA  -Pt is severely dehydrated   -CT abd showed RL Pna possible 2/2 gn bacteria, cxr showing new pneumonia on right, possible aspiration, pt NPO  -Distended GB- DW IR not perc drain candidate for now as GB distention is minimal  -Sacral ulcer seen by burn, not infected  -No phlebitis seen  -ID consult appreciated- continue flagyl and cefipeme   -Will do ct abd and pelvic with contrast, Cr improved, continue hydration  -continue cefapime and flagyl   -COVID Neg  -Sepsis present on admission  -CXR: no evidence of acute cardio-pulmonary disease  -U/A: no leukocytes, no nitrites    2. BROOKS on CKD stage III with HAGMA (improved)   - likely pre-renal etiology vs. ATN  - last Cr was 1.1; was 2.8 on admission  - FENa is 0.4% (pre-renal)  - nephrology consult appreciated, continue IVF  0.45 Nacl    3. Hyperosmolarity Hypernatremia   - 158 today, since pt has NGT DCed IVF and started on Free water, 300 cc q6hrs  - likely due to dehydration  - corrected sodium level is 178-->158  - follow up BMP at 4pm and 1130     4. Coagulopathy and Acute Blood Loss Anemia (BRPR)  - Correcting coagulopathy - 2 Bags FFPs and 5mg IV Vit K  - Transfuse 1 PRBC and placed 1 Unit on Hold  - Protonix 40mg IV q12h  - GI Care appreciated - pt to be scoped today likely (discussed with GI Fellow - awaiting Stat Labs)    # Right facial droop likely old   unlikley new as family said she stopped speaking 3 months ago as per house staff, possible cva   CT Head shows Cerebral atrophy    #nutrition  -continue NGT feeding     4. Elevated troponin  - likely demand ischemia  -trending down    5. Elevated liver enzymes  - DDx includes sepsis   -check THAD, AMA, ASMA, iron studies, ceruloplasmin, anti-LKM1, hepatitis panel,   -improving     6. HTN  - hold Atenolol as patient's BP currently low-normal; resume when BP stable    7. Type II DM  - hold Metformin  - monitor FS and start insulin if FS persistently >180    8. Cognitive impairment likely dementia  - continue supportive measures per nursing staff    9. DLD  - hold statin due to elevated LFTs    10. DVT prophylaxis  - heparin SQ BID    11. GI prophylaxis  - PPI daily    #Progress Note Handoff  Pending (specify): continue antibiotics, monitor for  improvement f/u GI Scopes   Family discussion: dw family agreed to plan above  Disposition: Home___/SNF___/Other___/Unknown at this time_x_  Poor Prognosis  Follow up Palliative Care  Pt seen during COVID 19 Pandemic.

## 2020-04-30 NOTE — PROGRESS NOTE ADULT - SUBJECTIVE AND OBJECTIVE BOX
Nephrology progress note    Patient was seen and examined, events over the last 24 h noted .  HypoK noted     Allergies:  No Known Allergies    Hospital Medications:   MEDICATIONS  (STANDING):  cefepime   IVPB      cefepime   IVPB 1000 milliGRAM(s) IV Intermittent every 24 hours  chlorhexidine 4% Liquid 1 Application(s) Topical <User Schedule>  glycerin Suppository - Adult 1 Suppository(s) Rectal at bedtime  iohexol 300 mG (iodine)/mL Oral Solution 30 milliLiter(s) Oral once  metroNIDAZOLE  IVPB 500 milliGRAM(s) IV Intermittent every 8 hours  metroNIDAZOLE  IVPB      multivitamin  Chewable 1 Tablet(s) Oral daily  norepinephrine Infusion 0.02 MICROgram(s)/kG/Min (1.65 mL/Hr) IV Continuous <Continuous>  pantoprazole  Injectable 40 milliGRAM(s) IV Push two times a day  polyethylene glycol 3350 17 Gram(s) Oral two times a day  potassium chloride   Powder 40 milliEquivalent(s) Oral every 4 hours  potassium chloride  20 mEq/100 mL IVPB 20 milliEquivalent(s) IV Intermittent every 2 hours  sodium chloride 0.45%. 1000 milliLiter(s) (50 mL/Hr) IV Continuous <Continuous>        VITALS:  T(F): 98.9 (20 @ 11:28), Max: 98.9 (20 @ 11:28)  HR: 85 (20 @ 12:09)  BP: 124/60 (20 @ 12:09)  RR: 20 (20 @ 05:35)  SpO2: 92% (20 @ 05:35)  Wt(kg): --     @ :  -   @ 07:00  --------------------------------------------------------  IN: 0 mL / OUT: 1151 mL / NET: -1151 mL     @ 07:01  -   @ 07:00  --------------------------------------------------------  IN: 481 mL / OUT: 1200 mL / NET: -719 mL          PHYSICAL EXAM:  Constitutional: NAD  HEENT: anicteric sclera, oropharynx clear, MMM  Neck: No JVD  Respiratory: CTAB, no wheezes, rales or rhonchi  Cardiovascular: S1, S2, RRR  Gastrointestinal: BS+, soft, NT/ND  Extremities: No cyanosis or clubbing. No peripheral edema  :  No bond.   Skin: No rashes    LABS:      158<H>  |  120<H>  |  43<H>  ----------------------------<  147<H>  2.6<LL>   |  21  |  0.9    Ca    7.8<L>      2020 09:57  Phos  2.4       Mg     2.3         TPro  5.1<L>  /  Alb  2.4<L>  /  TBili  0.5  /  DBili      /  AST  104<H>  /  ALT  64<H>  /  AlkPhos  98                            10.0   9.71  )-----------( 128      ( 2020 09:57 )             30.8       Urine Studies:  Urinalysis Basic - ( 2020 20:00 )    Color: Yellow / Appearance: Clear / S.023 / pH:   Gluc:  / Ketone: Negative  / Bili: Negative / Urobili: <2 mg/dL   Blood:  / Protein: 100 mg/dL / Nitrite: Negative   Leuk Esterase: Negative / RBC: 2 /HPF / WBC 3 /HPF   Sq Epi:  / Non Sq Epi: 6 /HPF / Bacteria: Negative      Creatinine, Random Urine: 95 mg/dL ( @ 20:00)  Sodium, Random Urine: 25.0 mmoL/L ( @ 20:00)  Osmolality, Random Urine: 608 mos/kg ( @ 20:00)    RADIOLOGY & ADDITIONAL STUDIES:

## 2020-04-30 NOTE — PROGRESS NOTE ADULT - SUBJECTIVE AND OBJECTIVE BOX
Pt seen and examined at bedside. unable to get ros. Pt stupeorous    PAST MEDICAL & SURGICAL HISTORY:  High cholesterol  Diabetes  Advanced dementia  S/P cataract surgery    Vital Signs Last 24 Hrs  T(C): 36.4 (30 Apr 2020 13:57), Max: 37.2 (30 Apr 2020 11:28)  T(F): 97.5 (30 Apr 2020 13:57), Max: 98.9 (30 Apr 2020 11:28)  HR: 85 (30 Apr 2020 13:57) (77 - 86)  BP: 122/56 (30 Apr 2020 13:57) (101/50 - 137/61)  BP(mean): 75 (29 Apr 2020 18:16) (75 - 78)  RR: 20 (30 Apr 2020 05:35) (20 - 30)  SpO2: 92% (30 Apr 2020 05:35) (92% - 100%)    PHYSICAL EXAM:  GENERAL: Lethargic / Encephalopathy semi obtunded   HEAD:  Atraumatic, Normocephalic  EYES: EOMI, PERRLA, conjunctiva and sclera clear  NECK: Supple, No JVD  CHEST/LUNG: Clear to auscultation bilaterally; No wheeze  HEART: Regular rate and rhythm; No murmurs, rubs, or gallops  ABDOMEN: Soft, Nontender, Nondistended; Bowel sounds present  EXTREMITIES:  2+ Peripheral Pulses, No clubbing, cyanosis, or edema  PSYCH: stuperous  NEUROLOGY: non-focal  SKIN: No rashes or lesions    LABS                        10.0   9.71  )-----------( 128      ( 30 Apr 2020 09:57 )             30.8       04-30    158<H>  |  120<H>  |  43<H>  ----------------------------<  147<H>  2.6<LL>   |  21  |  0.9    Ca    7.8<L>      30 Apr 2020 09:57  Phos  2.4     04-29  Mg     2.3     04-29    TPro  5.1<L>  /  Alb  2.4<L>  /  TBili  0.5  /  DBili  x   /  AST  104<H>  /  ALT  64<H>  /  AlkPhos  98  04-30    Monocyte # : 0.23 K/uL    Imaging reviewed:   < from: CT Abdomen and Pelvis w/ Oral Cont and w/ IV Cont (04.28.20 @ 11:43) >  IMPRESSION:     1.  Right lower lobe consolidation, compatible with pneumonia.  2.  Distended gallbladder with mild wall edema, nonspecific.  3.  Urinary bladder distended despite presence of Banegas catheter.  4.  Nonspecific endometrial enhancement. Recommend further evaluation with nonemergent outpatient pelvic sonogram as clinically warranted.  5.  Large rectal stool burden.    < end of copied text >      < from: Xray Chest 1 View- PORTABLE-Urgent (04.27.20 @ 17:40) >    IMPRESSION:      1. Enteric tube coiled in the stomach.  2. New bilateral opacities, right worse than left, and small right pleural effusion.    < end of copied text >    MEDICATIONS  (STANDING):  cefepime   IVPB 1000 milliGRAM(s) IV Intermittent every 12 hours  chlorhexidine 4% Liquid 1 Application(s) Topical <User Schedule>  glycerin Suppository - Adult 1 Suppository(s) Rectal at bedtime  iohexol 300 mG (iodine)/mL Oral Solution 30 milliLiter(s) Oral once  metroNIDAZOLE  IVPB 500 milliGRAM(s) IV Intermittent every 8 hours   multivitamin  Chewable 1 Tablet(s) Oral daily  norepinephrine Infusion 0.02 MICROgram(s)/kG/Min (1.65 mL/Hr) IV Continuous <Continuous>  pantoprazole  Injectable 40 milliGRAM(s) IV Push two times a day  polyethylene glycol 3350 17 Gram(s) Oral two times a day  potassium chloride   Powder 40 milliEquivalent(s) Oral every 4 hours  sodium chloride 0.45%. 1000 milliLiter(s) (50 mL/Hr) IV Continuous <Continuous>    MEDICATIONS  (PRN):  acetaminophen    Suspension .. 650 milliGRAM(s) Enteral Tube every 6 hours PRN Temp greater or equal to 38C (100.4F)  acetaminophen  Suppository .. 650 milliGRAM(s) Rectal every 6 hours PRN Temp greater or equal to 38C (100.4F)  bisacodyl Suppository 10 milliGRAM(s) Rectal <User Schedule> PRN Constipation      MEDICATIONS  (PRN):  acetaminophen    Suspension .. 650 milliGRAM(s) Enteral Tube every 6 hours PRN Temp greater or equal to 38C (100.4F)  acetaminophen  Suppository .. 650 milliGRAM(s) Rectal every 6 hours PRN Temp greater or equal to 38C (100.4F)  bisacodyl Suppository 10 milliGRAM(s) Rectal <User Schedule> PRN Constipation

## 2020-04-30 NOTE — PROGRESS NOTE ADULT - SUBJECTIVE AND OBJECTIVE BOX
ANDER TRACEY  89y, Female  Allergy: No Known Allergies      LOS  5d    CHIEF COMPLAINT: Generalized weakness (29 Apr 2020 16:04)      INTERVAL EVENTS/HPI  - No acute events overnight  - T(F): , Max: 98.8 (04-30-20 @ 05:35) afebrile   - WBC Count: 9.71 (04-30-20 @ 09:57)  WBC Count: 10.55 (04-29-20 @ 16:15  - Creatinine, Serum: 0.9 (04-29-20 @ 16:15)  Creatinine, Serum: 1.0 (04-29-20 @ 04:30)       ROS  unable to obtain history secondary to patient's mental status and/or sedation     VITALS:  T(F): 98.8, Max: 98.8 (04-30-20 @ 05:35)  HR: 83  BP: 116/56  RR: 20Vital Signs Last 24 Hrs  T(C): 37.1 (30 Apr 2020 05:35), Max: 37.1 (30 Apr 2020 05:35)  T(F): 98.8 (30 Apr 2020 05:35), Max: 98.8 (30 Apr 2020 05:35)  HR: 83 (30 Apr 2020 09:41) (73 - 94)  BP: 116/56 (30 Apr 2020 09:41) (88/49 - 137/61)  BP(mean): 75 (29 Apr 2020 18:16) (62 - 78)  RR: 20 (30 Apr 2020 05:35) (20 - 30)  SpO2: 92% (30 Apr 2020 05:35) (92% - 100%)    PHYSICAL EXAM:  Gen: Elderly F  HEENT: Normocephalic, atraumatic  Neck: supple, no lymphadenopathy  CV: Regular rate & regular rhythm  Lungs: decreased BS at bases, no fremitus  Abdomen: Soft, BS present  Ext: Warm, well perfused  Neuro: non focal, not responding to questions  Skin: no rash, no erythema  Lines: no phlebitis      FH: Non-contributory  Social Hx: Non-contributory    TESTS & MEASUREMENTS:                        10.0   9.71  )-----------( 128      ( 30 Apr 2020 09:57 )             30.8     04-29    153<H>  |  120<H>  |  48<H>  ----------------------------<  173<H>  3.4<L>   |  19  |  0.9    Ca    7.2<L>      29 Apr 2020 16:15  Phos  2.4     04-29  Mg     2.3     04-29    TPro  4.1<L>  /  Alb  1.9<L>  /  TBili  0.5  /  DBili  x   /  AST  135<H>  /  ALT  71<H>  /  AlkPhos  81  04-29    eGFR if Non African American: 55 mL/min/1.73M2 (04-29-20 @ 16:15)  eGFR if : 63 mL/min/1.73M2 (04-29-20 @ 16:15)    LIVER FUNCTIONS - ( 29 Apr 2020 04:30 )  Alb: 1.9 g/dL / Pro: 4.1 g/dL / ALK PHOS: 81 U/L / ALT: 71 U/L / AST: 135 U/L / GGT: x               Culture - Urine (collected 04-25-20 @ 20:00)  Source: .Urine Catheterized  Final Report (04-27-20 @ 10:09):    No growth    Culture - Blood (collected 04-25-20 @ 16:10)  Source: .Blood Blood  Preliminary Report (04-27-20 @ 01:02):    No growth to date.    Culture - Blood (collected 04-25-20 @ 16:10)  Source: .Blood Blood  Preliminary Report (04-27-20 @ 01:02):    No growth to date.        Lactate, Blood: 6.0 mmol/L (04-26-20 @ 07:31)      INFECTIOUS DISEASES TESTING  COVID-19 PCR: NotDetec (04-25-20 @ 15:28)  Procalcitonin, Serum: 0.23 (04-25-20 @ 15:00)      INFLAMMATORY MARKERS  C-Reactive Protein, Serum: 0.84 mg/dL (04-25-20 @ 15:00)      RADIOLOGY & ADDITIONAL TESTS:  I have personally reviewed the last available Chest xray  CXR  Xray Chest 1 View- PORTABLE-Urgent:   EXAM:  XR CHEST PORTABLE URGENT 1V            PROCEDURE DATE:  04/27/2020            INTERPRETATION:  CLINICAL HISTORY: Tube placement. Hypoxia, leukocytosis.    COMPARISON: 4/25/2020.    TECHNIQUE/POSITIONING: Frontal view of the chest.    FINDINGS:    Support devices: Enteric tube coiled in the stomach.    Cardiac/mediastinum/hilum: Aortic calcifications.    Lung parenchyma/Pleura: New bilateral opacities, right worse than left. Small right pleural effusion.    Skeleton/soft tissues: Severe degenerative change in the shoulders.      IMPRESSION:      1. Enteric tube coiled in the stomach.  2. New bilateral opacities, right worse than left, and small right pleural effusion.                  RENAN BRADEN M.D., ATTENDING RADIOLOGIST  This document has been electronically signed. Apr 27 2020  5:46PM             (04-27-20 @ 17:40)      CT  CT Abdomen and Pelvis w/ Oral Cont and w/ IV Cont:   EXAM:  CT ABDOMEN AND PELVIS OC IC            PROCEDURE DATE:  04/28/2020            INTERPRETATION:  CLINICAL STATEMENT: Weakness. Leukocytosis.    TECHNIQUE: Contiguous axial CT images were obtained from the lower chest to the pubic symphysis following administration of 100cc Optiray 320 intravenous contrast.  Oral contrast was administered.  Reformatted images in the coronal and sagittal planes were acquired.    COMPARISON CT: None.      FINDINGS:    LOWER CHEST: Partially imaged patchy right lower lobe consolidation. Left basilar and right middle lobe atelectasis.  Coronary artery calcifications.    HEPATOBILIARY: Hepatic steatosis. Distended gallbladder with mild wall edema. No biliary ductal dilatation. Scattered hepatic hypodensitiestoo small to characterize.    SPLEEN: Unremarkable.    PANCREAS: Unremarkable.    ADRENAL GLANDS: Unremarkable.    KIDNEYS: Symmetric renal enhancement. No hydronephrosis.    ABDOMINOPELVIC NODES: No lymphadenopathy.    PELVIC ORGANS: Urinary bladderdistended despite presence of Banegas catheter. Intraluminal air likely related to instrumentation. Nonspecific endometrial enhancement (series 4 image 218). Right adnexal calcification.    PERITONEUM/MESENTERY/BOWEL: Large rectal stool burden. Colonicdiverticulosis without evidence of diverticulitis. No evidence of bowel obstruction or pneumoperitoneum. Unremarkable appendix. Enteric tube terminating within the stomach.    BONES/SOFT TISSUES: Diffuse osteopenia. Degenerative changes of the spine.Grade 1 anterolisthesis of L4 and L5.    OTHER: Atherosclerosis of the aorta and its branches.      IMPRESSION:     1.  Right lower lobe consolidation, compatible with pneumonia.  2.  Distended gallbladder with mild wall edema, nonspecific.  3.  Urinary bladder distended despite presence of Banegas catheter.  4.  Nonspecific endometrial enhancement. Recommend further evaluation with nonemergent outpatient pelvic sonogram as clinically warranted.  5.  Large rectal stool burden.              STEVE RICK, RESIDENT RADIOLOGIST  This document has been electronically signed.  SHELTON MCKENZIE M.D., ATTENDING RADIOLOGIST  This document has been electronically signed. Apr 28 2020 12:15PM             (04-28-20 @ 11:43)      CARDIOLOGY TESTING  12 Lead ECG:   Ventricular Rate 98 BPM    Atrial Rate 98 BPM    P-R Interval 110 ms    QRS Duration 70 ms    Q-T Interval 396 ms    QTC Calculation(Bezet) 505 ms    P Axis -28 degrees    R Axis 64 degrees    T Axis 63 degrees    Diagnosis Line Sinus rhythm with short NC  ST & T wave abnormality, consider anterior ischemia  Prolonged QT  Abnormal ECG    Confirmed by JANIE ADAMS, MAXIM ([id number]) on 4/26/2020 12:17:20 AM (04-25-20 @ 16:51)      MEDICATIONS  cefepime   IVPB   cefepime   IVPB 1000  chlorhexidine 4% Liquid 1  glycerin Suppository - Adult 1  iohexol 300 mG (iodine)/mL Oral Solution 30  metroNIDAZOLE  IVPB 500  metroNIDAZOLE  IVPB   multivitamin  Chewable 1  norepinephrine Infusion 0.02  pantoprazole  Injectable 40  polyethylene glycol 3350 17  potassium chloride   Powder 40  sodium chloride 0.45%. 1000      WEIGHT  Weight (kg): 44 (04-25-20 @ 20:25)  Creatinine, Serum: 0.9 mg/dL (04-29-20 @ 16:15)      ANTIBIOTICS:  cefepime   IVPB      cefepime   IVPB 1000 milliGRAM(s) IV Intermittent every 24 hours  metroNIDAZOLE  IVPB 500 milliGRAM(s) IV Intermittent every 8 hours  metroNIDAZOLE  IVPB          All available historical records have been reviewed

## 2020-04-30 NOTE — PROGRESS NOTE ADULT - ASSESSMENT
89-year-old female with a PMH of HTN, type II DM, DLD, CKD stage III, and cognitive impairment who presented with generalized weakness for the past one week , patient was found to have pneumonia , tested negative for COVID. Patient was also found to have hypernatremia  , and is being treated for both. GI are consulted for melena of one day duration , worsening overnight , associated with  fresh blood per rectum overnight , patient was started on levophed for hypotension. Patient dropped HGB from11.6 baseline to 7.4 today. Patient is not on any antiplatelet or therapeutic anticoagulation.    1- fresh blood per rectum in the setting of septic shock, on levophed, code status DNR, drop in HGB 11.6 baseline to 7.4 today, low Plt, elevated INR, no signs of active GI bleed, s/p 2u PRBC, 1 u FFP, 5mg Vit K, NPO, active T&S, serial cbc, 2x 18 gauge iv, PPI q12h, egd when electrolyte abnormalities corrected    2- transaminitis / thrombocytopenia / prolonged INR, likely multifactorial (sepsis induced, medication induced, malnutrition, underlying liver cirrhosis), follow up ultrasound liver and doppler of PV, avoid hepatotoxic medication, CLD workup if worsening

## 2020-04-30 NOTE — PROGRESS NOTE ADULT - SUBJECTIVE AND OBJECTIVE BOX
INTERVAL HPI/OVERNIGHT EVENTS:    89-year-old female with a PMH of HTN, type II DM, DLD, CKD stage III, and cognitive impairment who presented with generalized weakness for the past one week , patient was found to have pneumonia , tested negative for COVID. Patient was also found to have hypernatremia  , and is being treated for both. GI are consulted for melena of one day duration , worsening overnight , associated with  fresh blood per rectum overnight , patient was started on levophed for hypotension. Patient dropped HGB from11.6 baseline to 7.4 today. Patient is not on any antiplatelet or therapeutic anticoagulation.      4/30 no bms as per staff, patient somnolent     PAST MEDICAL & SURGICAL HISTORY:  High cholesterol  Diabetes  Advanced dementia  No pertinent past medical history  S/P cataract surgery      Home Medications:  atenolol 50 mg oral tablet: 1 tab(s) orally once a day (25 Apr 2020 20:01)  atorvastatin 20 mg oral tablet: 1 tab(s) orally once a day (at bedtime) (25 Apr 2020 20:01)  metFORMIN 500 mg oral tablet: 1 tab(s) orally 2 times a day (25 Apr 2020 20:01)      MEDICATIONS  (STANDING):  cefepime   IVPB      cefepime   IVPB 1000 milliGRAM(s) IV Intermittent every 24 hours  chlorhexidine 4% Liquid 1 Application(s) Topical <User Schedule>  glycerin Suppository - Adult 1 Suppository(s) Rectal at bedtime  iohexol 300 mG (iodine)/mL Oral Solution 30 milliLiter(s) Oral once  metroNIDAZOLE  IVPB 500 milliGRAM(s) IV Intermittent every 8 hours  metroNIDAZOLE  IVPB      multivitamin  Chewable 1 Tablet(s) Oral daily  norepinephrine Infusion 0.02 MICROgram(s)/kG/Min (1.65 mL/Hr) IV Continuous <Continuous>  pantoprazole  Injectable 40 milliGRAM(s) IV Push two times a day  polyethylene glycol 3350 17 Gram(s) Oral two times a day  potassium chloride   Powder 40 milliEquivalent(s) Oral every 4 hours  potassium chloride  20 mEq/100 mL IVPB 20 milliEquivalent(s) IV Intermittent every 2 hours  sodium chloride 0.45%. 1000 milliLiter(s) (50 mL/Hr) IV Continuous <Continuous>    MEDICATIONS  (PRN):  acetaminophen    Suspension .. 650 milliGRAM(s) Enteral Tube every 6 hours PRN Temp greater or equal to 38C (100.4F)  acetaminophen  Suppository .. 650 milliGRAM(s) Rectal every 6 hours PRN Temp greater or equal to 38C (100.4F)  bisacodyl Suppository 10 milliGRAM(s) Rectal <User Schedule> PRN Constipation      Allergies    No Known Allergies    Intolerances        Review of systems  General: + fatigue   Skin: no new rash   Ophtalmologic: no new visual changes   Respiratory: no new shortness of breath   Cardiovascular: no new chest pain   Gastrointestinal: as per H&P   Genitourinary: no new dysuria   Neurological: somnolent    otherwise as described above     PHYSICAL EXAM:   Vital Signs:  Vital Signs Last 24 Hrs  T(C): 37.2 (30 Apr 2020 11:28), Max: 37.2 (30 Apr 2020 11:28)  T(F): 98.9 (30 Apr 2020 11:28), Max: 98.9 (30 Apr 2020 11:28)  HR: 85 (30 Apr 2020 12:09) (73 - 86)  BP: 124/60 (30 Apr 2020 12:09) (101/50 - 137/61)  BP(mean): 75 (29 Apr 2020 18:16) (75 - 78)  RR: 20 (30 Apr 2020 05:35) (20 - 30)  SpO2: 92% (30 Apr 2020 05:35) (92% - 100%)  Daily     Daily     GENERAL:  somnolent   SKIN: no new changes   HEENT:  NC/AT,  anicteric  CHEST:   no new increased effort, breath sounds clear  HEART:  Regular rhythm  ABDOMEN:  Soft, positive bowel sounds, non-tender, no distension, rigidity, rebound, guarding, ascites   EXTREMITIES:  no new cyanosis  PSYCHIATRIC: no change       LABS:                        10.0   9.71  )-----------( 128      ( 30 Apr 2020 09:57 )             30.8       Hemoglobin: 10.0 g/dL (04-30-20 @ 09:57)  Hemoglobin: 9.4 g/dL (04-29-20 @ 16:15)  Hemoglobin: 7.4 g/dL (04-29-20 @ 04:30)  Hemoglobin: 9.5 g/dL (04-28-20 @ 19:27)  Hemoglobin: 10.5 g/dL (04-28-20 @ 05:43)  Hemoglobin: 11.6 g/dL (04-27-20 @ 13:50)      04-30    158<H>  |  120<H>  |  43<H>  ----------------------------<  147<H>  2.6<LL>   |  21  |  0.9    Ca    7.8<L>      30 Apr 2020 09:57  Phos  2.4     04-29  Mg     2.3     04-29    TPro  5.1<L>  /  Alb  2.4<L>  /  TBili  0.5  /  DBili  x   /  AST  104<H>  /  ALT  64<H>  /  AlkPhos  98  04-30      INR: 1.13 ratio (04-30-20 @ 09:57)  INR: 1.87 ratio (04-29-20 @ 16:15)  INR: 2.32 ratio (04-29-20 @ 04:30)  INR: 2.10 ratio (04-28-20 @ 19:27)      Aspartate Aminotransferase (AST/SGOT): 104 U/L (04-30-20 @ 09:57)  Alanine Aminotransferase (ALT/SGPT): 64 U/L (04-30-20 @ 09:57)  Alkaline Phosphatase, Serum: 98 U/L (04-30-20 @ 09:57)  Aspartate Aminotransferase (AST/SGOT): 135 U/L (04-29-20 @ 04:30)  Alanine Aminotransferase (ALT/SGPT): 71 U/L (04-29-20 @ 04:30)  Alkaline Phosphatase, Serum: 81 U/L (04-29-20 @ 04:30)  Alanine Aminotransferase (ALT/SGPT): 77 U/L (04-29-20 @ 00:43)  Alkaline Phosphatase, Serum: 97 U/L (04-29-20 @ 00:43)  Aspartate Aminotransferase (AST/SGOT): 157 U/L (04-29-20 @ 00:43)  Aspartate Aminotransferase (AST/SGOT): 205 U/L (04-28-20 @ 19:27)  Alanine Aminotransferase (ALT/SGPT): 92 U/L (04-28-20 @ 19:27)  Alkaline Phosphatase, Serum: 137 U/L (04-28-20 @ 19:27)  Alkaline Phosphatase, Serum: 121 U/L (04-28-20 @ 05:43)  Aspartate Aminotransferase (AST/SGOT): 118 U/L (04-28-20 @ 05:43)  Alanine Aminotransferase (ALT/SGPT): 73 U/L (04-28-20 @ 05:43)  Aspartate Aminotransferase (AST/SGOT): 113 U/L (04-27-20 @ 13:50)  Alanine Aminotransferase (ALT/SGPT): 75 U/L (04-27-20 @ 13:50)  Alkaline Phosphatase, Serum: 97 U/L (04-27-20 @ 13:50)            RADIOLOGY & ADDITIONAL TESTS:

## 2020-04-30 NOTE — OCCUPATIONAL THERAPY INITIAL EVALUATION ADULT - SPECIFY REASON(S)
As per MD, pt continues to be medically acute with rehab tx on hold. Pt scheduled for endoscopy 4/30. OT will follow up with pt when medically appropriate.

## 2020-04-30 NOTE — PROGRESS NOTE ADULT - ASSESSMENT
89y Female PMH of HTN, type II DM, DLD, CKD stage III, and cognitive impairment presented with generalized weakness, decreased oral intake.    # BROOKS / hypernatremia   - BROOKS l resolved   -  Na  improving then worse    - non-oliguric   -  change 1/2NS to D5W   -repelte K   - BP noted. keep MAP > 65   - Hb at goal.   - avoid nephrotoxins and hypotension   - no need for RRT   - will follow

## 2020-04-30 NOTE — PROGRESS NOTE ADULT - ASSESSMENT
ASSESSMENT   89-year-old female with a PMH of HTN, type II DM, DLD, CKD stage III, and cognitive impairment who presented with generalized weakness for the past one week    IMPRESSION  #RLL PNA possible aspiration    CXR New bilateral opacities, right worse than left, and small right pleural effusion. (not present on admission 4/25 CXR )    Tm 100.6, Severe sepsis on admission P>90 WBC >12, BROOKS    4/25 UCX NGTD     4/25 BCX NGTD     COVID-19 PCR: NotDetec (04-25-20 @ 15:28)    Procalcitonin, Serum: 0.23 ng/mL (04-25-20 @ 15:00)  #Melena  #Hypernatremia  #Elevated Trop BNP  #Transaminitis     TBili  0.7  /  DBili  x   /  AST  113<H>  /  ALT  75<H>  /  AlkPhos  97 4/27  #Thrombocytopenia  #QTC prolongation QTC Calculation(Bezet) 505 ms      RECOMMENDATIONS  - INCREASE to Cefepime 1g q12h IV given crcl 4/27-  will complete 7 days end 5/3 (if d/c prior po vantin/flagyl)  - Continue flagyl 500mg q8h IV  - Send urine Ag for Strep and Legionella   - Send sputum cx if possible  - Grave prognosis. [facility]    Spectra [phone]

## 2020-04-30 NOTE — PHYSICAL THERAPY INITIAL EVALUATION ADULT - SPECIFY REASON(S)
Hold PT at this time per team discussion during rounds secondary pt is medically unstable for therapy. Will f/u when appropriate.

## 2020-04-30 NOTE — PROGRESS NOTE ADULT - ASSESSMENT
A 89-year-old female with a PMH of HTN, type II DM, DLD, CKD stage III, and cognitive impairment who presented with generalized weakness for the past one week and decreased oral intake.    1. Generalized weakness 2/2 suspected GN pneumonia / Aspiration PNA / Sepsis POA  -Pt is severely dehydrated   -CT abd showed RL Pna possible 2/2 gn bacteria, cxr showing new pneumonia on right, possible aspiration, pt NPO  -Distended GB- DW IR not perc drain candidate for now as GB distention is minimal  -Sacral ulcer seen by burn, not infected  -No phlebitis seen  -ID consult appreciated- continue Fagyl 500mg IV q8h, Cefipeme 1g IV q12h  -Will do ct abd and pelvic with contrast, Cr improved, continue hydration  -COVID Neg  -CXR: no evidence of acute cardio-pulmonary disease  -U/A: no leukocytes, no nitrites    2. BROOKS on CKD stage III with HAGMA (improved)   - likely pre-renal etiology vs. ATN  - last Cr was 1.1; was 2.8 on admission  - FENa is 0.4% (pre-renal)  - nephrology consult appreciated, continue IVF  0.45 Nacl    3. Hyperosmolarity Hypernatremia   - 158 today, since pt has NGT DCed IVF and started on Free water, 300 cc q6hrs  - likely due to dehydration  - corrected sodium level is 178-->158  - follow up BMP at 4pm and 1130     4. Liver Cirrhosis, Thrombocytopenia, Coagulopathy and Acute Blood Loss Anemia (BRPR)  - Correcting coagulopathy s/p - 2 Bags FFPs and 5mg IV Vit K  - Transfuse 1 PRBC and placed 1 Unit on Hold  - Protonix 40mg IV q12h  - GI Care appreciated -- Possible EGD/Colonoscopy when K is normal per GI test postponed for possibly tomorrow     # HypoKalemia - due to decreased oral intake  - KCL 20meq IV q2hrs x 3doses   - f/u K this evening and am     # Right facial droop likely old   vitorley new as family said she stopped speaking 3 months ago as per house staff, possible cva   CT Head shows Cerebral atrophy    #nutrition  -continue NGT feeding     4. Elevated troponin  - likely demand ischemia  -trending down    5. Elevated liver enzymes  - DDx includes sepsis   -check THAD, AMA, ASMA, iron studies, ceruloplasmin, anti-LKM1, hepatitis panel,   -improving     6. HTN  - hold Atenolol as patient's BP currently low-normal; resume when BP stable    7. Type II DM  - hold Metformin  - monitor FS and start insulin if FS persistently >180    8. Cognitive impairment likely dementia  - continue supportive measures per nursing staff    9. DLD  - hold statin due to elevated LFTs    10. DVT prophylaxis  - heparin SQ BID    11. GI prophylaxis  - PPI daily    #Progress Note Handoff  Pending (specify): continue antibiotics, monitor for  improvement f/u GI Scopes   Family discussion: dw family agreed to plan above  Disposition: Home___/SNF___/Other___/Unknown at this time_x_    Poor Prognosis  Follow up Palliative Care  Pt seen during COVID 19 Pandemic.

## 2020-05-01 NOTE — CONSULT NOTE ADULT - ASSESSMENT
Consult Summary: Pt is a 89 yr old female with aspiration PNA, sepsis and declined mental status. Subsequent liver and kidney failure as well. Pt on non rebreather mask and NGT feeds. Pt in respiratory distress on NRB at 15liters of O2. Pt's daughter at the bedside with pt's grandson. Pt's daughter speaks English and Romanian and her son is bilingual, she wanted him to translate information.     Introduced palliative care, palliative NP and MD met with family at bedside. Pt is DNR/DNI, but daughter struggling with mom dying. SHe has a grave prognosis and she was informed. Grandson a good support who is very realistic. Discussed concern for her suffering. Also made family aware that despite aggressive medical care she is still dying. Daughter upset about not being able to take body to [place]. Support provided.     Time spent discussing advance care planning/end of life care 35 min    Morphine Equivalent Daily Dose (MEDD): 0    Recommendations:  DNR/DNI  Morphine 2mg IV Q 2 HRS PRN for respiratory distress   re-evaluate aggressive measure in 24-48 hrs, palliative 24/7 number [phone] provided to family  pastoral care follow up      Please Call [phone] PRN Consult Summary: Pt is a 89 yr old female with aspiration PNA, sepsis and declined mental status. Subsequent liver and kidney failure as well. Pt on non rebreather mask and NGT feeds. Pt in respiratory distress on NRB at 15liters of O2. Pt's daughter at the bedside with pt's grandson. Pt's daughter speaks English and Sami and her son is bilingual, she wanted him to translate information.     Introduced palliative care, palliative NP and MD met with family at bedside. Pt is DNR/DNI, but daughter struggling with mom dying. SHe has a grave prognosis and she was informed. Grandson a good support who is very realistic. Discussed concern for her suffering. Also made family aware that despite aggressive medical care she is still dying. Daughter upset about not being able to take body to [place]. Support provided. Offered idea of comfort measures only, daughter still too overwhelmed to make this decision now, but is aware she may die soon no matter what is done.     Time spent discussing advance care planning/end of life care 35 min    Morphine Equivalent Daily Dose (MEDD): 0    Recommendations:  DNR/DNI  Morphine 2mg IV Q 2 HRS PRN for respiratory distress   re-evaluate aggressive measure in 24-48 hrs, palliative 24/7 number [phone] provided to family  pastoral care follow up      Please Call [phone] PRN Consult Summary: Pt is a 89 yr old female with aspiration PNA, sepsis and declined mental status. Subsequent liver and kidney failure as well. Pt on non rebreather mask and NGT feeds. Pt in respiratory distress on NRB at 15liters of O2. Pt's daughter at the bedside with pt's grandson. Pt's daughter speaks English and Albanian and her son is bilingual, she wanted him to translate information.     Introduced palliative care, palliative NP and MD met with family at bedside. Pt is DNR/DNI, but daughter struggling with mom dying. SHe has a grave prognosis and she was informed. Grandson a good support who is very realistic. Discussed concern for her suffering. Also made family aware that despite aggressive medical care she is still dying. Daughter upset about not being able to take pt's body to [place] after her passing. Support provided. Offered idea of comfort measures only, daughter still too overwhelmed to make this decision now, but is aware she may die soon no matter what is done.     Time spent discussing advance care planning/end of life care 35 min    Morphine Equivalent Daily Dose (MEDD): 0    Recommendations:  DNR/DNI  Morphine 2mg IV Q 2 HRS PRN for respiratory distress   re-evaluate aggressive measure in 24-48 hrs, palliative 24/7 number [phone] provided to family  pastoral care follow up      Please Call [phone] PRN

## 2020-05-01 NOTE — PROGRESS NOTE ADULT - ASSESSMENT
A 89-year-old female with a PMH of HTN, type II DM, DLD, CKD stage III, and cognitive impairment who presented with generalized weakness for the past one week and decreased oral intake.    1. Generalized weakness 2/2 Gram Negative Pneumonia / Aspiration PNA / Sepsis Shock POA  -Pt is severely dehydrated   -CT abd showed RL Pna possible 2/2 gn bacteria  -cxr showing new pneumonia on right  - NGT for Feeding  -Distended GB- DW IR not perc drain candidate for now as GB distention is minimal  -Sacral ulcer seen by burn, not infected  -No phlebitis seen  -ID consult appreciated- continue Faggyl 500mg IV q8h, Cefipeme 1g IV q12h  -COVID Neg  -U/A: no leukocytes, no nitrites    2. BROOKS on CKD stage III with HAGMA (improved)   - likely pre-renal etiology vs. ATN  - last Cr was 1.1; was 2.8 on admission  - FENa is 0.4% (pre-renal)  - nephrology consult appreciated    3. Hyperosmolarity Hypernatremia   - 153 today  - NGT Free water change to 300cc q4hrs  - likely due to dehydration  - corrected sodium level is 159-153  - follow up BMP am     4. Liver Cirrhosis, Thrombocytopenia, Coagulopathy and Acute Blood Loss Anemia (BRPR)  - Correcting coagulopathy s/p - 2 Bags FFPs and 5mg IV Vit K  - Transfuse 1 PRBC and placed 1 Unit on Hold  - Protonix 40mg IV q12h  - GI Care appreciated -- Possible EGD/Colonoscopy when K is normal per GI test postponed for possibly tomorrow     # HypoKalemia 2.9  - due to decreased oral intake  - KCL 20meq IV q2hrs x 3doses   - KLur 40meq NGT once   - f/u K this evening and Mg     # Right facial droop likely old   unlikley new as family said she stopped speaking 3 months ago as per house staff, possible cva   CT Head shows Cerebral atrophy    #nutrition  -continue NGT feeds  - Free wanter 300cc q4h     4. Elevated troponin  - Due to demand Ischemia  - trending down    5. Transaminitis / Thrombocytopenia   - Due to Sepsis and Shock / Blood loss - anemia   - On Levophed    6. HTN  - hold Atenolol as patient's BP currently low-normal; resume when BP stable    7. Type II DM  - hold Metformin  - monitor FS and start insulin if FS persistently >180    8. Cognitive impairment likely dementia  - continue supportive measures per nursing staff    9. DLD  - hold statin due to elevated LFTs    10. DVT prophylaxis  - heparin SQ BID    11. GI prophylaxis  - PPI daily    Poor Prognosis  Palliative Care    Pt seen during COVID 19 Pandemic.    Dispo: Acute / f/u Palliative Care / Daughter Ayde Mcdonnell given permission to visit today 10-20min Max. A 89-year-old female with a PMH of HTN, type II DM, DLD, CKD stage III, and cognitive impairment who presented with generalized weakness for the past one week and decreased oral intake.    1. Generalized weakness 2/2 Gram Negative Pneumonia / Aspiration PNA / Sepsis Shock POA  -Pt is severely dehydrated   -CT abd showed RL Pna possible 2/2 gn bacteria  -cxr showing new pneumonia on right  - NGT for Feeding  -Distended GB- DW IR not perc drain candidate for now as GB distention is minimal  -Sacral ulcer seen by burn, not infected  -No phlebitis seen  -ID consult appreciated- continue Faggyl 500mg IV q8h, Cefipeme 1g IV q12h  -COVID Neg  -U/A: no leukocytes, no nitrites    2. BROOKS on CKD stage III with HAGMA (improved)   - likely pre-renal etiology vs. ATN  - last Cr was 1.1; was 2.8 on admission  - FENa is 0.4% (pre-renal)  - nephrology consult appreciated    3. Hyperosmolarity Hypernatremia   - 153 today  - NGT Free water change to 300cc q4hrs  - likely due to dehydration  - corrected sodium level is 159-153  - follow up BMP am     4. Liver Cirrhosis, Thrombocytopenia, Coagulopathy and Acute Blood Loss Anemia (BRPR)  - Correcting coagulopathy s/p - 2 Bags FFPs and 5mg IV Vit K  - Transfuse 2 PRBC and placed 2 Unit   - Protonix 40mg IV q12h  - GI Care appreciated -- Possible EGD/Colonoscopy when K is normal per GI test postponed for possibly tomorrow     # HypoKalemia 2.9  - due to decreased oral intake  - KCL 20meq IV q2hrs x 3doses   - KLur 40meq NGT once   - f/u K this evening and Mg     # Right facial droop likely old   unlikley new as family said she stopped speaking 3 months ago as per house staff, possible cva   CT Head shows Cerebral atrophy    #nutrition  -continue NGT feeds  - Free wanter 300cc q4h     4. Elevated troponin  - Due to demand Ischemia  - trending down    5. Transaminitis / Thrombocytopenia   - Due to Sepsis and Shock / Blood loss - anemia   - On Levophed    6. HTN  - hold Atenolol as patient's BP currently low-normal; resume when BP stable    7. Type II DM  - hold Metformin  - monitor FS and start insulin if FS persistently >180    8. Cognitive impairment likely dementia  - continue supportive measures per nursing staff    9. DLD  - hold statin due to elevated LFTs    10. DVT prophylaxis  - heparin SQ BID    11. GI prophylaxis  - PPI daily    Poor Prognosis  Palliative Care    Pt seen during COVID 19 Pandemic.    Dispo: Acute / f/u Palliative Care / Daughter Ayde Mcdonnell given permission to visit today 10-20min Max.

## 2020-05-01 NOTE — GOALS OF CARE CONVERSATION - ADVANCED CARE PLANNING - CONVERSATION DETAILS
89F with MMP from home who presents with Septic Shock and BRBPR on Levophed. Pt is DNR.   Case d/w daughter / HCP who understands her mother is very sick and might not make it as now she is in Respiratory Failure and Obtunded due to overall medical condition. Daughter wants Mother to be given a chance with antibiotics and blood transfusions and Oxygen support as needed. Daughter given permission to come visit her mother today. She understands of dismal prognosis.   She agrees to PALLIATIVE CARE.

## 2020-05-01 NOTE — PROGRESS NOTE ADULT - SUBJECTIVE AND OBJECTIVE BOX
Nephrology progress note    Patient was seen and examined, events over the last 24 h noted .  K imrpoved   down again today     Allergies:  No Known Allergies    Hospital Medications:   MEDICATIONS  (STANDING):  cefepime   IVPB 1000 milliGRAM(s) IV Intermittent every 12 hours  chlorhexidine 4% Liquid 1 Application(s) Topical <User Schedule>  dextrose 5%. 1000 milliLiter(s) (50 mL/Hr) IV Continuous <Continuous>  glycerin Suppository - Adult 1 Suppository(s) Rectal at bedtime  iohexol 300 mG (iodine)/mL Oral Solution 30 milliLiter(s) Oral once  metroNIDAZOLE  IVPB 500 milliGRAM(s) IV Intermittent every 8 hours  metroNIDAZOLE  IVPB      multivitamin  Chewable 1 Tablet(s) Oral daily  norepinephrine Infusion 0.02 MICROgram(s)/kG/Min (1.65 mL/Hr) IV Continuous <Continuous>  pantoprazole  Injectable 40 milliGRAM(s) IV Push two times a day  polyethylene glycol 3350 17 Gram(s) Oral two times a day  potassium chloride   Powder 40 milliEquivalent(s) Oral every 4 hours        VITALS:  T(F): 98.6 (20 @ 08:21), Max: 98.9 (20 @ 11:28)  HR: 67 (20 @ 10:15)  BP: 108/62 (20 @ 10:15)  RR: 19 (20 @ 10:15)  SpO2: 92% (20 @ 10:15)  Wt(kg): --     @ :  -   @ 07:00  --------------------------------------------------------  IN: 481 mL / OUT: 1200 mL / NET: -719 mL     @ 07:01  -   @ 07:00  --------------------------------------------------------  IN: 691.7 mL / OUT: 1240 mL / NET: -548.3 mL          PHYSICAL EXAM:  Constitutional: NAD  HEENT: anicteric sclera, oropharynx clear, MMM  Neck: No JVD  Respiratory: CTAB, no wheezes, rales or rhonchi  Cardiovascular: S1, S2, RRR  Gastrointestinal: BS+, soft, NT/ND  Extremities: No cyanosis or clubbing. No peripheral edema  :  No bond.   Skin: No rashes    LABS:      153<H>  |  118<H>  |  35<H>  ----------------------------<  217<H>  2.6<LL>   |  21  |  0.9    Ca    7.5<L>      01 May 2020 08:33  Mg     1.8         TPro  4.7<L>  /  Alb  2.2<L>  /  TBili  0.6  /  DBili      /  AST  67<H>  /  ALT  52<H>  /  AlkPhos  93                            9.7    8.84  )-----------( 143      ( 01 May 2020 08:33 )             30.5       Urine Studies:  Urinalysis Basic - ( 2020 20:00 )    Color: Yellow / Appearance: Clear / S.023 / pH:   Gluc:  / Ketone: Negative  / Bili: Negative / Urobili: <2 mg/dL   Blood:  / Protein: 100 mg/dL / Nitrite: Negative   Leuk Esterase: Negative / RBC: 2 /HPF / WBC 3 /HPF   Sq Epi:  / Non Sq Epi: 6 /HPF / Bacteria: Negative      Creatinine, Random Urine: 95 mg/dL ( @ 20:00)  Sodium, Random Urine: 25.0 mmoL/L ( @ 20:00)  Osmolality, Random Urine: 608 mos/kg ( @ 20:00)    RADIOLOGY & ADDITIONAL STUDIES:

## 2020-05-01 NOTE — CONSULT NOTE ADULT - SUBJECTIVE AND OBJECTIVE BOX
REQUESTED OF: DR Vazquez    Chart reviewed, Hospital Day 6    ANDER TRACEY 89yFemale  HPI:  The patient is a 89-year-old female with a PMH of HTN, type II DM, DLD, CKD stage III, and cognitive impairment who presented with generalized weakness for the past one week.  History was obtained from the patient's family members over the telephone due to the patient's mental status.  She has had decreased oral intake over the past 3-4 days.  At her baseline, the patient is non-verbal and ambulates with assistance.  However over the past one week she's been mostly bedbound.  The patient had an episode of vomiting today.  She also appeared to have labored breathing per family today.  Otherwise, the patient reportedly did not have fever, chills, abdominal pain, bowel or urinary complaints.  The patient's family denied any recent sick contacts. (25 Apr 2020 18:44)        PAST MEDICAL & SURGICAL HISTORY:  High cholesterol  Diabetes  Advanced dementia  No pertinent past medical history  S/P cataract surgery      Subjective and Objective:  Today,  Discussed with hospitalist    Focused Palliative Care Evaluation:                   Symptoms:                                      Pain                                     Dyspnea YES                                     N/V                                     Appetite                                     Anxiety                                     Other _____________________                     Support Devices: NRB mask, NGT             PHYSICAL EXAM:      Constitutional: debilitated, elderly    Respiratory: Pt RR 28-32 using accessory muscles to breath    Cardiovascular: (-) JVD    Gastrointestinal: large abdomen, ? acities    Genitourinary: bond to straight drainage avtar urine    Extremities: anasarca noted upper and lower extremities    Neurological: minimally responsive, only withdraws to pain, non verbal.    Skin: see nursing assessment        T(C): 36.7, Max: 37 (08:21)  HR: 80 (67 - 125)  BP: 99/53 (93/53 - 149/67)  RR: 19 (18 - 20)  SpO2: 95% (92% - 98%)      LABS/STUDIES:  05-01    153<H>  |  118<H>  |  35<H>  ----------------------------<  217<H>  2.6<LL>   |  21  |  0.9    Ca    7.5<L>      01 May 2020 08:33  Mg     1.8     05-01    TPro  4.7<L>  /  Alb  2.2<L>  /  TBili  0.6  /  DBili  x   /  AST  67<H>  /  ALT  52<H>  /  AlkPhos  93  05-01                            9.6    9.65  )-----------( 167      ( 01 May 2020 11:13 )             30.4       MEDICATIONS  (STANDING):  cefepime   IVPB 1000 milliGRAM(s) IV Intermittent every 12 hours  chlorhexidine 4% Liquid 1 Application(s) Topical <User Schedule>  dextrose 5%. 1000 milliLiter(s) (50 mL/Hr) IV Continuous <Continuous>  iohexol 300 mG (iodine)/mL Oral Solution 30 milliLiter(s) Oral once  metroNIDAZOLE  IVPB 500 milliGRAM(s) IV Intermittent every 8 hours  metroNIDAZOLE  IVPB      multivitamin  Chewable 1 Tablet(s) Oral daily  norepinephrine Infusion 0.02 MICROgram(s)/kG/Min (1.65 mL/Hr) IV Continuous <Continuous>  pantoprazole  Injectable 40 milliGRAM(s) IV Push two times a day  potassium chloride   Powder 40 milliEquivalent(s) Oral every 4 hours  potassium chloride  20 mEq/100 mL IVPB 20 milliEquivalent(s) IV Intermittent every 2 hours    MEDICATIONS  (PRN):  acetaminophen    Suspension .. 650 milliGRAM(s) Enteral Tube every 6 hours PRN Temp greater or equal to 38C (100.4F)  acetaminophen  Suppository .. 650 milliGRAM(s) Rectal every 6 hours PRN Temp greater or equal to 38C (100.4F)          iStop: #: [id number]    There are no results for the search terms that you entered.        PPS  Level    10%       Note PPS = Palliative Performance Scale; (c)2001, [facility]       Range from 100% meaning Full ambulation/self-care/intake/Level of Consicous                                                                              to        10% meaning Bedbound/Unable to do any activity/extensive disease /Total Care/ No PO intake/ LOC=Full/drowsy/+/-confusion        (0% = death)                     Prior to acute illness, patient's functionality reportedly needed assist with all ADLS

## 2020-05-01 NOTE — PROGRESS NOTE ADULT - ASSESSMENT
89-year-old female with a PMH of HTN, type II DM, DLD, CKD stage III, and cognitive impairment who presented with generalized weakness for the past one week , patient was found to have pneumonia , tested negative for COVID. Patient was also found to have hypernatremia  , and is being treated for both. GI are consulted for melena of one day duration , worsening overnight , associated with  fresh blood per rectum overnight , patient was started on levophed for hypotension. Patient dropped HGB from11.6 baseline to 7.4 today. Patient is not on any antiplatelet or therapeutic anticoagulation.    1- fresh blood per rectum  that resolved   Hg stable yesterday (no labs today)    SP 2 U PRBC 1 u FFP, 5mg Vit K, on april 29   Low Plt, elevated INR, no signs of active GI bleed  Rec:    active T&S, serial cbc, 2x 18 gauge iv, PPI q12h,  Will hold on EGD for today  given no signs of active Bleed and Electrolytes still not corrected   Please get labs today     # Septic shock,   On cefepime and flagyl   still on levophed, code status DNR      #transaminitis / thrombocytopenia / prolonged INR, likely multifactorial (sepsis induced, medication induced, malnutrition, underlying liver cirrhosis), follow up ultrasound liver and doppler of PV, avoid hepatotoxic medication, CLD workup negative so far except for ASMA of 1/40

## 2020-05-01 NOTE — CONSULT NOTE ADULT - REASON FOR ADMISSION
Generalized weakness

## 2020-05-01 NOTE — PROGRESS NOTE ADULT - SUBJECTIVE AND OBJECTIVE BOX
Pt seen and examined at bedside. unable to get ros. Pt stupeorous    PAST MEDICAL & SURGICAL HISTORY:  High cholesterol  Diabetes  Advanced dementia  S/P cataract surgery    Vital Signs Last 24 Hrs  T(C): 37 (01 May 2020 08:21), Max: 37 (01 May 2020 08:21)  T(F): 98.6 (01 May 2020 08:21), Max: 98.6 (01 May 2020 08:21)  HR: 67 (01 May 2020 10:15) (67 - 125)  BP: 108/62 (01 May 2020 10:15) (93/53 - 149/67)  RR: 19 (01 May 2020 10:15) (18 - 20)  SpO2: 92% (01 May 2020 10:15) (92% - 98%)    PHYSICAL EXAM:  GENERAL: Lethargic / Encephalopathy, Obtunded   HEAD:  Atraumatic, Normocephalic  EYES: EOMI, PERRLA, conjunctiva and sclera clear  NECK: Supple, No JVD  CHEST/LUNG: Clear to auscultation bilaterally; No wheeze  HEART: Regular rate and rhythm; No murmurs, rubs, or gallops  ABDOMEN: Soft, Nontender, Nondistended; Bowel sounds present  EXTREMITIES:  2+ Peripheral Pulses, No clubbing, cyanosis, or edema  NEUROLOGY: non-focal  SKIN: No rashes or lesions    LABS                          9.6    9.65  )-----------( 167      ( 01 May 2020 11:13 )             30.4       05-01    153<H>  |  118<H>  |  35<H>  ----------------------------<  217<H>  2.6<LL>   |  21  |  0.9    Ca    7.5<L>      01 May 2020 08:33  Mg     1.8     05-01    TPro  4.7<L>  /  Alb  2.2<L>  /  TBili  0.6  /  DBili  x   /  AST  67<H>  /  ALT  52<H>  /  AlkPhos  93  05-01    Monocyte # : 0.23 K/uL    Imaging reviewed:   < from: CT Abdomen and Pelvis w/ Oral Cont and w/ IV Cont (04.28.20 @ 11:43) >  IMPRESSION:     1.  Right lower lobe consolidation, compatible with pneumonia.  2.  Distended gallbladder with mild wall edema, nonspecific.  3.  Urinary bladder distended despite presence of Banegas catheter.  4.  Nonspecific endometrial enhancement. Recommend further evaluation with nonemergent outpatient pelvic sonogram as clinically warranted.  5.  Large rectal stool burden.    < end of copied text >      < from: Xray Chest 1 View- PORTABLE-Urgent (04.27.20 @ 17:40) >    IMPRESSION:      1. Enteric tube coiled in the stomach.  2. New bilateral opacities, right worse than left, and small right pleural effusion.    < end of copied text >    MEDICATIONS  (STANDING):  cefepime   IVPB 1000 milliGRAM(s) IV Intermittent every 12 hours  chlorhexidine 4% Liquid 1 Application(s) Topical <User Schedule>  dextrose 5%. 1000 milliLiter(s) (50 mL/Hr) IV Continuous <Continuous>  iohexol 300 mG (iodine)/mL Oral Solution 30 milliLiter(s) Oral once  metroNIDAZOLE  IVPB 500 milliGRAM(s) IV Intermittent every 8 hours     multivitamin  Chewable 1 Tablet(s) Oral daily  norepinephrine Infusion 0.02 MICROgram(s)/kG/Min (1.65 mL/Hr) IV Continuous <Continuous>  pantoprazole  Injectable 40 milliGRAM(s) IV Push two times a day  potassium chloride   Powder 40 milliEquivalent(s) Oral every 4 hours  potassium chloride   Powder 40 milliEquivalent(s) Oral once  potassium chloride  20 mEq/100 mL IVPB 20 milliEquivalent(s) IV Intermittent every 2 hours    MEDICATIONS  (PRN):  acetaminophen    Suspension .. 650 milliGRAM(s) Enteral Tube every 6 hours PRN Temp greater or equal to 38C (100.4F)  acetaminophen  Suppository .. 650 milliGRAM(s) Rectal every 6 hours PRN Temp greater or equal to 38C (100.4F)

## 2020-05-01 NOTE — PROGRESS NOTE ADULT - SUBJECTIVE AND OBJECTIVE BOX
We are following the patient for GI bleed      GI HPI Today:  Pt did not have a BM since yesterday and no signs of GI bleed   No hematemesis   Nurse unable to push meds through NG tube     PAST MEDICAL & SURGICAL HISTORY  High cholesterol  Diabetes  Advanced dementia  No pertinent past medical history  S/P cataract surgery      ALLERGIES:  No Known Allergies      MEDICATIONS:  MEDICATIONS  (STANDING):  cefepime   IVPB 1000 milliGRAM(s) IV Intermittent every 12 hours  chlorhexidine 4% Liquid 1 Application(s) Topical <User Schedule>  dextrose 5%. 1000 milliLiter(s) (50 mL/Hr) IV Continuous <Continuous>  glycerin Suppository - Adult 1 Suppository(s) Rectal at bedtime  iohexol 300 mG (iodine)/mL Oral Solution 30 milliLiter(s) Oral once  metroNIDAZOLE  IVPB 500 milliGRAM(s) IV Intermittent every 8 hours  metroNIDAZOLE  IVPB      multivitamin  Chewable 1 Tablet(s) Oral daily  norepinephrine Infusion 0.02 MICROgram(s)/kG/Min (1.65 mL/Hr) IV Continuous <Continuous>  pantoprazole  Injectable 40 milliGRAM(s) IV Push two times a day  polyethylene glycol 3350 17 Gram(s) Oral two times a day  potassium chloride   Powder 40 milliEquivalent(s) Oral every 4 hours    MEDICATIONS  (PRN):  acetaminophen    Suspension .. 650 milliGRAM(s) Enteral Tube every 6 hours PRN Temp greater or equal to 38C (100.4F)  acetaminophen  Suppository .. 650 milliGRAM(s) Rectal every 6 hours PRN Temp greater or equal to 38C (100.4F)  bisacodyl Suppository 10 milliGRAM(s) Rectal <User Schedule> PRN Constipation      REVIEW OF SYSTEMS  unable to obtain        VITALS:   T(F): 97.6 (05-01 @ 05:44), Max: 102.4 (04-28 @ 17:58)  HR: 82 (05-01 @ 05:44) (73 - 125)  BP: 111/53 (05-01 @ 05:44) (86/37 - 149/67)  BP(mean): 75 (04-29 @ 18:16) (62 - 78)  RR: 20 (05-01 @ 05:44) (16 - 97)  SpO2: 97% (05-01 @ 05:44) (92% - 100%)        PHYSICAL EXAM:  GENERAL:  Appears in no distress  HEENT:  Conjunctivae Anicteric   CHEST:  Full & symmetric excursion  HEART:  NS1, S2,   ABDOMEN:  Soft, non-tender, non-distended  EXTEREMITIES:  no edema  SKIN:  No rash  NEURO:  Alert         Blood Work :                        10.0   9.71  )-----------( 128      ( 30 Apr 2020 09:57 )             30.8     PT/INR - ( 30 Apr 2020 09:57 )  INR: 1.13          PTT - ( 30 Apr 2020 09:57 )  PTT:31.2   04-30    159<H>  |  122<H>  |  43<H>  ----------------------------<  200<H>  4.4   |  17  |  1.1    Ca    7.8<L>      30 Apr 2020 17:42  Phos  2.4     04-29  Mg     2.3     04-29      CBC -  ( 30 Apr 2020 09:57 )  Hemoglobin : 10.0    CBC -  ( 29 Apr 2020 16:15 )  Hemoglobin : 9.4    CBC -  ( 29 Apr 2020 04:30 )  Hemoglobin : 7.4    CBC -  ( 28 Apr 2020 19:27 )  Hemoglobin : 9.5    CBC -  ( 28 Apr 2020 05:43 )  Hemoglobin : 10.5    CBC -  ( 27 Apr 2020 13:50 )  Hemoglobin : 11.6      LIVER FUNCTIONS - ( 30 Apr 2020 09:57 )  Alb: 2.4 [3.5 - 5.2] / Pro: 5.1 [6.0 - 8.0] / ALK PHOS: 98 [30 - 115] / ALT: 64 [0 - 41] / AST: 104 [0 - 41] / GGT: x     LIVER FUNCTIONS - ( 29 Apr 2020 04:30 )  Alb: 1.9 [3.5 - 5.2] / Pro: 4.1 [6.0 - 8.0] / ALK PHOS: 81 [30 - 115] / ALT: 71 [0 - 41] / AST: 135 [0 - 41] / GGT: x     LIVER FUNCTIONS - ( 29 Apr 2020 00:43 )  Alb: 1.9 [3.5 - 5.2] / Pro: 4.2 [6.0 - 8.0] / ALK PHOS: 97 [30 - 115] / ALT: 77 [0 - 41] / AST: 157 [0 - 41] / GGT: x     LIVER FUNCTIONS - ( 28 Apr 2020 19:27 )  Alb: 2.2 [3.5 - 5.2] / Pro: 4.8 [6.0 - 8.0] / ALK PHOS: 137 [30 - 115] / ALT: 92 [0 - 41] / AST: 205 [0 - 41] / GGT: x     LIVER FUNCTIONS - ( 28 Apr 2020 05:43 )  Alb: 2.2 [3.5 - 5.2] / Pro: 4.9 [6.0 - 8.0] / ALK PHOS: 121 [30 - 115] / ALT: 73 [0 - 41] / AST: 118 [0 - 41] / GGT: x     LIVER FUNCTIONS - ( 27 Apr 2020 13:50 )  Alb: 2.5 [3.5 - 5.2] / Pro: 5.1 [6.0 - 8.0] / ALK PHOS: 97 [30 - 115] / ALT: 75 [0 - 41] / AST: 113 [0 - 41] / GGT: x     LIVER FUNCTIONS - ( 26 Apr 2020 18:18 )  Alb: 2.9 [3.5 - 5.2] / Pro: 5.6 [6.0 - 8.0] / ALK PHOS: 83 [30 - 115] / ALT: 95 [0 - 41] / AST: 144 [0 - 41] / GGT: x     LIVER FUNCTIONS - ( 26 Apr 2020 07:31 )  Alb: 2.8 [3.5 - 5.2] / Pro: 5.8 [6.0 - 8.0] / ALK PHOS: 76 [30 - 115] / ALT: 103 [0 - 41] / AST: 138 [0 - 41] / GGT: x     LIVER FUNCTIONS - ( 25 Apr 2020 23:30 )  Alb: 3.1 [3.5 - 5.2] / Pro: 5.9 [6.0 - 8.0] / ALK PHOS: 79 [30 - 115] / ALT: 122 [0 - 41] / AST: 169 [0 - 41] / GGT: x     LIVER FUNCTIONS - ( 25 Apr 2020 15:00 )  Alb: 4.1 [3.5 - 5.2] / Pro: 7.9 [6.0 - 8.0] / ALK PHOS: 108 [30 - 115] / ALT: 191 [0 - 41] / AST: 287 [0 - 41] / GGT: x

## 2020-05-02 NOTE — PROGRESS NOTE ADULT - ASSESSMENT
A 89-year-old female with a PMH of HTN, type II DM, DLD, CKD stage III, and cognitive impairment who presented with generalized weakness for the past one week and decreased oral intake.    1. Generalized weakness 2/2 Gram Negative Pneumonia / Aspiration PNA / Sepsis Shock POA  -Pt is severely dehydrated   -CT abd showed RL Pna possible 2/2 gn bacteria  -cxr showing new pneumonia on right  - NGT for Feeding  -Distended GB- DW IR not perc drain candidate for now as GB distention is minimal  -Sacral ulcer seen by burn, not infected  -No phlebitis seen  -ID consult appreciated- continue Faggyl 500mg IV q8h, Cefipeme 1g IV q12h  -COVID Neg  -U/A: no leukocytes, no nitrites    2. BROOKS on CKD stage III with HAGMA (improved)   - likely pre-renal etiology vs. ATN  - last Cr was 1.1; was 2.8 on admission  - FENa is 0.4% (pre-renal)  - nephrology consult appreciated    3. Hyperosmolarity Hypernatremia   - 153 today  - NGT Free water change to 300cc q4hrs  - likely due to dehydration  - corrected sodium level is 159-153  - follow up BMP am     4. Liver Cirrhosis, Thrombocytopenia, Coagulopathy and Acute Blood Loss Anemia (BRPR)  - Correcting coagulopathy s/p - 2 Bags FFPs and 5mg IV Vit K  - Transfuse 2 PRBC and placed 2 Unit   - Protonix 40mg IV q12h  - GI Care appreciated -- Possible EGD/Colonoscopy when K is normal per GI test postponed for possibly tomorrow     # HypoKalemia 2.9  - due to decreased oral intake  - KCL 20meq IV q2hrs x 3doses   - KLur 40meq NGT once   - f/u K this evening and Mg     # Right facial droop likely old   unlikley new as family said she stopped speaking 3 months ago as per house staff, possible cva   CT Head shows Cerebral atrophy    #nutrition  -continue NGT feeds  - Free wanter 300cc q4h     4. Elevated troponin  - Due to demand Ischemia  - trending down    5. Transaminitis / Thrombocytopenia   - Due to Sepsis and Shock / Blood loss - anemia   - On Levophed    6. HTN  - hold Atenolol as patient's BP currently low-normal; resume when BP stable    7. Type II DM  - hold Metformin  - monitor FS and start insulin if FS persistently >180    8. Cognitive impairment likely dementia  - continue supportive measures per nursing staff    9. DLD  - hold statin due to elevated LFTs    10. DVT prophylaxis  - heparin SQ BID    11. GI prophylaxis  - PPI daily    Palliative Care on board  We are speaking to Daughter Joaquin Messer HCP to try to start withdrawing care as pt is not benefiting from IV Pressors and aggressive care rather prolonging the inevitable. She is obtunded and hypoxic on 100% NRM.     Pt seen during COVID 19 Pandemic.    Dispo: GRAVE PROGNOSIS

## 2020-05-02 NOTE — PROGRESS NOTE ADULT - REASON FOR ADMISSION
Generalized weakness

## 2020-05-02 NOTE — CHART NOTE - NSCHARTNOTEFT_GEN_A_CORE
Registered Dietitian Follow-Up     Patient Profile Reviewed                           Yes [x]   No []     Nutrition History Previously Obtained        Yes [x]  No []       Subjective information: Last EN bolus given on 5/1 in the am. See below.     Pertinent Medical Interventions: Pt followed by GI for GI bleed. BRBPR resolved. EGD held off (5/1) d/t no signs of active bleed + electrolytes not corrected-- possibly to perform EGD today (5/2). Per nephro, BROOKS resolved and there is no need for RRT at this time. Pt followed by Palliative Care team, daughter is not yet ready for CMO at this time.     Diet order: NPO with NGT. Jevity 1.2 total volume 960 mL/d. Bolus rate of 240 mL q6hrs. At goal this regimen provides: 1152 kcal, 53 g protein and 778 mL free H2O. Being held at this time for the above reasons/planned procedures     Anthropometrics:  - Ht. 60"  - Wt. dosing 44 kg/97 lbs; (4/27) 45.8 kg/101 lbs  - %wt change wt loss noted in previous assessment-- UBW is ~109 lbs  - BMI 18.9 using dosing wt  - IBW 45.5 kg/100 lbs      Pertinent Lab Data: (5/1) RBC 3.66, H/H 9.6/30.4, POC glucose 176-228, Na 147, BUN 30, glucose 171      Pertinent Meds: maxipime, flagyl, D5 (50 mL/h provides 204 kcal/d), morphine, levophed, omnipaque, protonix, KCl, MVI     Physical Findings:  - Appearance: underweight; (5/1) +2 edema (L hand), +3 edema (R hand)  - GI function: last BM on 4/29, audible/normoactive bowel sounds present  - Tubes: NGT  - Oral/Mouth cavity: NPO  - Skin: stage II pressure ulcer (sacrum)     Nutrition Requirements  Weight Used: 44 kg/97 lbs (needs continued from initial RD assessment)     Estimated Energy Needs    Continue [x] 921-1152kcal (MSJ x 1.2-1.5 AF) for PU, low BMI     Estimated Protein Needs    Continue [x] protein 53-61g (1.2-1.4g/kg CBW) as above + CKD considered      Estimated Fluid Needs        Continue [x] 1mL/kcal or per LIP    Nutrient Intake: not currently meeting needs; EN regimen meets 100-125% estimated energy requirements and % estimated protein requirements        [x] Previous Nutrition Diagnosis: 1. Inadequate protein/energy intake 2. Moderate PCM in the context of acute illness            [x] Ongoing               Nutrition Intervention EN, coordination of care     Goal/Expected Outcome: pt to reinitiate EN and meet % estimated protein/energy requirements as medically feasible within the next 3 days     Indicator/Monitoring: RD to monitor diet order, energy intake, body composition, NFPF, glucose/renal/electrolyte profiles    Recommendation: Consider switching to Glucerna 1.2 EN formula for pt with DM + elevated blood glucose. Total volume 960 mL/d. Bolus rate of 240 mL q6hrs. This regimen will provide 1152 kcal, 58 g protein and 778 mL free H2O. Additional fluids per LIP. Maintain aspiration precautions, monitor/replete electrolytes PRN

## 2020-05-02 NOTE — CHART NOTE - NSCHARTNOTEFT_GEN_A_CORE
Spoke with Daughter and pt's grandson for translation via telephone. Daughter made aware of unchanging status of patient. I spoke to her about withdrawing care, was unwilling to do so.     Daughter spoken to again in the afternoon, daughter's friend works in the hospital, was able to facetime with patient. At that time, daughter agreed to withdraw the levophed, still wishes to continue with IVF and oxygen. Will D/C REN morel and Dr Loya aware.

## 2020-05-02 NOTE — OCCUPATIONAL THERAPY INITIAL EVALUATION ADULT - HEALTH SCREEN CRITERIA
1- xanax sent 10/3/19 with 2 RF  2- continue with physical therapy  3- allergies updated by NP  4- stop losartan and start valsartan  5- start victoza if paid by ins.      no

## 2020-05-02 NOTE — PROGRESS NOTE ADULT - SUBJECTIVE AND OBJECTIVE BOX
Pt seen and examined at bedside. unable to get ros. Pt stupeorous    PAST MEDICAL & SURGICAL HISTORY:  High cholesterol  Diabetes  Advanced dementia  S/P cataract surgery    Vital Signs Last 24 Hrs  T(C): 35.8 (02 May 2020 13:35), Max: 36.7 (01 May 2020 14:00)  T(F): 96.5 (02 May 2020 13:35), Max: 98.1 (01 May 2020 14:00)  HR: 83 (02 May 2020 13:35) (58 - 95)  BP: 96/39 (02 May 2020 13:35) (84/46 - 146/48)  RR: 19 (02 May 2020 13:35) (17 - 20)  SpO2: 92% (02 May 2020 13:35) (86% - 99%)    PHYSICAL EXAM:  GENERAL: Lethargic / Encephalopathy, Obtunded   HEAD:  Atraumatic, Normocephalic  EYES: EOMI, PERRLA, conjunctiva and sclera clear  NECK: Supple, No JVD  CHEST/LUNG: Clear to auscultation bilaterally; No wheeze  HEART: Regular rate and rhythm; No murmurs, rubs, or gallops  ABDOMEN: Soft, Nontender, Nondistended; Bowel sounds present  EXTREMITIES:  2+ Peripheral Pulses, No clubbing, cyanosis, or edema  NEUROLOGY: non-focal  SKIN: No rashes or lesions    LABS                        9.6    9.65  )-----------( 167      ( 01 May 2020 11:13 )             30.4       05-01    147<H>  |  112<H>  |  30<H>  ----------------------------<  171<H>  3.8   |  20  |  0.8    Ca    7.3<L>      01 May 2020 21:27  Mg     1.8     05-01    TPro  4.7<L>  /  Alb  2.2<L>  /  TBili  0.6  /  DBili  x   /  AST  67<H>  /  ALT  52<H>  /  AlkPhos  93  05-01    Monocyte # : 0.23 K/uL    Imaging reviewed:   < from: CT Abdomen and Pelvis w/ Oral Cont and w/ IV Cont (04.28.20 @ 11:43) >  IMPRESSION:     1.  Right lower lobe consolidation, compatible with pneumonia.  2.  Distended gallbladder with mild wall edema, nonspecific.  3.  Urinary bladder distended despite presence of Banegas catheter.  4.  Nonspecific endometrial enhancement. Recommend further evaluation with nonemergent outpatient pelvic sonogram as clinically warranted.  5.  Large rectal stool burden.    < end of copied text >    < from: Xray Chest 1 View- PORTABLE-Urgent (04.27.20 @ 17:40) >    IMPRESSION:      1. Enteric tube coiled in the stomach.  2. New bilateral opacities, right worse than left, and small right pleural effusion.    < end of copied text >    MEDICATIONS  (STANDING):  cefepime   IVPB 1000 milliGRAM(s) IV Intermittent every 12 hours  chlorhexidine 4% Liquid 1 Application(s) Topical <User Schedule>  dextrose 5%. 1000 milliLiter(s) (50 mL/Hr) IV Continuous <Continuous>  iohexol 300 mG (iodine)/mL Oral Solution 30 milliLiter(s) Oral once  metroNIDAZOLE  IVPB 500 milliGRAM(s) IV Intermittent every 8 hours     multivitamin  Chewable 1 Tablet(s) Oral daily  norepinephrine Infusion 0.02 MICROgram(s)/kG/Min (1.65 mL/Hr) IV Continuous <Continuous>  pantoprazole  Injectable 40 milliGRAM(s) IV Push two times a day  potassium chloride   Powder 40 milliEquivalent(s) Oral every 4 hours  potassium chloride   Powder 40 milliEquivalent(s) Oral once  potassium chloride  20 mEq/100 mL IVPB 20 milliEquivalent(s) IV Intermittent every 2 hours    MEDICATIONS  (PRN):  acetaminophen    Suspension .. 650 milliGRAM(s) Enteral Tube every 6 hours PRN Temp greater or equal to 38C (100.4F)  acetaminophen  Suppository .. 650 milliGRAM(s) Rectal every 6 hours PRN Temp greater or equal to 38C (100.4F)

## 2020-05-03 NOTE — DISCHARGE NOTE FOR THE EXPIRED PATIENT - HOSPITAL COURSE
A 89-year-old female with a PMH of HTN, type II DM, DLD, CKD stage III, and cognitive impairment who presented with generalized weakness for the past one week and decreased oral intake.    1. Generalized weakness 2/2 Gram Negative Pneumonia / Aspiration PNA / Sepsis Shock POA  -Pt is severely dehydrated   -CT abd showed RL Pna possible 2/2 gn bacteria  -cxr showing new pneumonia on right  - NGT for Feeding  -Distended GB- DW IR not perc drain candidate for now as GB distention is minimal  -Sacral ulcer seen by burn, not infected  -No phlebitis seen  -ID consult appreciated- continue Faggyl 500mg IV q8h, Cefipeme 1g IV q12h  -COVID Neg  -U/A: no leukocytes, no nitrites    2. BROOKS on CKD stage III with HAGMA (improved)   - likely pre-renal etiology vs. ATN  - last Cr was 1.1; was 2.8 on admission  - FENa is 0.4% (pre-renal)  - nephrology consult appreciated    3. Hyperosmolarity Hypernatremia   - 153 today  - NGT Free water change to 300cc q4hrs  - likely due to dehydration  - corrected sodium level is 159-153  - follow up BMP am     4. Liver Cirrhosis, Thrombocytopenia, Coagulopathy and Acute Blood Loss Anemia (BRPR)  - Correcting coagulopathy s/p - 2 Bags FFPs and 5mg IV Vit K  - Transfuse 2 PRBC and placed 2 Unit   - Protonix 40mg IV q12h  - GI Care appreciated -- Possible EGD/Colonoscopy when K is normal per GI test postponed for possibly tomorrow     # HypoKalemia 2.9  - due to decreased oral intake  - KCL 20meq IV q2hrs x 3doses   - KLur 40meq NGT once   - f/u K this evening and Mg     # Right facial droop likely old   unlikley new as family said she stopped speaking 3 months ago as per house staff, possible cva   CT Head shows Cerebral atrophy    #nutrition  -continue NGT feeds  - Free wanter 300cc q4h     4. Elevated troponin  - Due to demand Ischemia  - trending down    5. Transaminitis / Thrombocytopenia   - Due to Sepsis and Shock / Blood loss - anemia   - On Levophed    6. HTN  - hold Atenolol as patient's BP currently low-normal; resume when BP stable    7. Type II DM  - hold Metformin  - monitor FS and start insulin if FS persistently >180    8. Cognitive impairment likely dementia  - continue supportive measures per nursing staff    9. DLD  - hold statin due to elevated LFTs    10. DVT prophylaxis  - heparin SQ BID      called by rn to evaluate patient.  Pt seen at bedside.  No chest rise, no carotid pulse, no response to painful stimuli.  No vitals obtained.   Pronounced at 1340. Called and discussed with hospitalist. A 89-year-old female with a PMH of HTN, type II DM, DLD, CKD stage III, and cognitive impairment who presented with generalized weakness for the past one week and decreased oral intake.    1. Generalized weakness 2/2 Gram Negative Pneumonia / Aspiration PNA / Sepsis Shock POA  -Pt is severely dehydrated   -CT abd showed RL Pna possible 2/2 gn bacteria  -cxr showing new pneumonia on right  - NGT for Feeding  -Distended GB- DW IR not perc drain candidate for now as GB distention is minimal  -Sacral ulcer seen by burn, not infected  -No phlebitis seen  -ID consult appreciated- continue Faggyl 500mg IV q8h, Cefipeme 1g IV q12h  -COVID Neg  -U/A: no leukocytes, no nitrites    2. BROOKS on CKD stage III with HAGMA (improved)   - likely pre-renal etiology vs. ATN  - last Cr was 1.1; was 2.8 on admission  - FENa is 0.4% (pre-renal)  - nephrology consult appreciated    3. Hyperosmolarity Hypernatremia   - 153 today  - NGT Free water change to 300cc q4hrs  - likely due to dehydration  - corrected sodium level is 159-153  - follow up BMP am     4. Liver Cirrhosis, Thrombocytopenia, Coagulopathy and Acute Blood Loss Anemia (BRPR)  - Correcting coagulopathy s/p - 2 Bags FFPs and 5mg IV Vit K  - Transfuse 2 PRBC and placed 2 Unit   - Protonix 40mg IV q12h  - GI Care appreciated -- Possible EGD/Colonoscopy when K is normal per GI test postponed for possibly tomorrow     # HypoKalemia 2.9  - due to decreased oral intake  - KCL 20meq IV q2hrs x 3doses   - KLur 40meq NGT once   - f/u K this evening and Mg     # Right facial droop likely old   unlikley new as family said she stopped speaking 3 months ago as per house staff, possible cva   CT Head shows Cerebral atrophy    #nutrition  -continue NGT feeds  - Free wanter 300cc q4h     4. Elevated troponin  - Due to demand Ischemia  - trending down    5. Transaminitis / Thrombocytopenia   - Due to Sepsis and Shock / Blood loss - anemia   - On Levophed    6. HTN  - hold Atenolol as patient's BP currently low-normal; resume when BP stable    7. Type II DM  - hold Metformin  - monitor FS and start insulin if FS persistently >180    8. Cognitive impairment likely dementia  - continue supportive measures per nursing staff    9. DLD  - hold statin due to elevated LFTs    10. DVT prophylaxis  - heparin SQ BID      called by rn to evaluate patient.  Pt seen at bedside.  No chest rise, no carotid pulse, no response to painful stimuli.  No vitals obtained.   Pronounced at 1340. Called and discussed with hospitalist.     Attending Attestation:   Pt seen and examined. Pt : 5/3/2020 1:40pm  Called the HCP Daughter ELADIO HICKS     Moreover in am: Permission was given for Eladio and Sister to come pay their respects for closure. met them at bedside and grave prognosis discussed.    More over agree with above PA Documentation     Death certificate in progress  NOT AN ME Case.    dx: PNA/ GN PNA / Aspiration / Sepsis  covid negative     No antibiotics for now.  oxygen (NC vs NRR) to maintain Po2 >93%, currently re A 89-year-old female with a PMH of HTN, type II DM, DLD, CKD stage III, and cognitive impairment who presented with generalized weakness for the past one week and decreased oral intake.    1. Generalized weakness 2/2 Gram Negative Pneumonia / Aspiration PNA / Sepsis Shock POA  -Pt is severely dehydrated   -CT abd showed RL Pna possible 2/2 gn bacteria  -cxr showing new pneumonia on right  - NGT for Feeding  -Distended GB- DW IR not perc drain candidate for now as GB distention is minimal  -Sacral ulcer seen by burn, not infected  -No phlebitis seen  -ID consult appreciated- continue Faggyl 500mg IV q8h, Cefipeme 1g IV q12h  -COVID Neg  -U/A: no leukocytes, no nitrites    2. BROOKS on CKD stage III with HAGMA (improved)   - likely pre-renal etiology vs. ATN  - last Cr was 1.1; was 2.8 on admission  - FENa is 0.4% (pre-renal)  - nephrology consult appreciated    3. Hyperosmolarity Hypernatremia   - 153 today  - NGT Free water change to 300cc q4hrs  - likely due to dehydration  - corrected sodium level is 159-153  - follow up BMP am     4. Liver Cirrhosis, Thrombocytopenia, Coagulopathy and Acute Blood Loss Anemia (BRPR)  - Correcting coagulopathy s/p - 2 Bags FFPs and 5mg IV Vit K  - Transfuse 2 PRBC and placed 2 Unit   - Protonix 40mg IV q12h  - GI Care appreciated -- Possible EGD/Colonoscopy when K is normal per GI test postponed for possibly tomorrow     # HypoKalemia 2.9 correncted     # Right facial droop likely old     called by rn to evaluate patient.  Pt seen at bedside.  No chest rise, no carotid pulse, no response to painful stimuli.  No vitals obtained.   Pronounced at 1340. Called and discussed with hospitalist.     Attending Attestation:   Pt seen and examined.     NO Vital Signs  CV: NO hear sounds  REsp: No breathing  Pulses: NO Pulse     Pronounced: 5/3/2020 1:40pm     Called the HCP Daughter ELADIO HICKS     Moreover in am: Permission was given for Eladio and Sister to come pay their respects for closure. met them at bedside and grave prognosis discussed.    More over agree with above PA Documentation     Death certificate in progress  NOT AN ME Case.    dx: PNA/ GN PNA / Aspiration / Sepsis  covid negative

## 2020-05-05 DIAGNOSIS — R65.21 SEVERE SEPSIS WITH SEPTIC SHOCK: ICD-10-CM

## 2020-05-05 DIAGNOSIS — N18.3 CHRONIC KIDNEY DISEASE, STAGE 3 (MODERATE): ICD-10-CM

## 2020-05-05 DIAGNOSIS — K92.2 GASTROINTESTINAL HEMORRHAGE, UNSPECIFIED: ICD-10-CM

## 2020-05-05 DIAGNOSIS — E11.22 TYPE 2 DIABETES MELLITUS WITH DIABETIC CHRONIC KIDNEY DISEASE: ICD-10-CM

## 2020-05-05 DIAGNOSIS — E86.0 DEHYDRATION: ICD-10-CM

## 2020-05-05 DIAGNOSIS — K74.60 UNSPECIFIED CIRRHOSIS OF LIVER: ICD-10-CM

## 2020-05-05 DIAGNOSIS — F03.90 UNSPECIFIED DEMENTIA WITHOUT BEHAVIORAL DISTURBANCE: ICD-10-CM

## 2020-05-05 DIAGNOSIS — D68.9 COAGULATION DEFECT, UNSPECIFIED: ICD-10-CM

## 2020-05-05 DIAGNOSIS — E87.6 HYPOKALEMIA: ICD-10-CM

## 2020-05-05 DIAGNOSIS — N17.0 ACUTE KIDNEY FAILURE WITH TUBULAR NECROSIS: ICD-10-CM

## 2020-05-05 DIAGNOSIS — D62 ACUTE POSTHEMORRHAGIC ANEMIA: ICD-10-CM

## 2020-05-05 DIAGNOSIS — I12.9 HYPERTENSIVE CHRONIC KIDNEY DISEASE WITH STAGE 1 THROUGH STAGE 4 CHRONIC KIDNEY DISEASE, OR UNSPECIFIED CHRONIC KIDNEY DISEASE: ICD-10-CM

## 2020-05-05 DIAGNOSIS — J69.0 PNEUMONITIS DUE TO INHALATION OF FOOD AND VOMIT: ICD-10-CM

## 2020-05-05 DIAGNOSIS — E44.0 MODERATE PROTEIN-CALORIE MALNUTRITION: ICD-10-CM

## 2020-05-05 DIAGNOSIS — I24.8 OTHER FORMS OF ACUTE ISCHEMIC HEART DISEASE: ICD-10-CM

## 2020-05-05 DIAGNOSIS — E87.2 ACIDOSIS: ICD-10-CM

## 2020-05-05 DIAGNOSIS — E78.5 HYPERLIPIDEMIA, UNSPECIFIED: ICD-10-CM

## 2020-05-05 DIAGNOSIS — R29.810 FACIAL WEAKNESS: ICD-10-CM

## 2020-05-05 DIAGNOSIS — Z66 DO NOT RESUSCITATE: ICD-10-CM

## 2020-05-05 DIAGNOSIS — J15.6 PNEUMONIA DUE TO OTHER GRAM-NEGATIVE BACTERIA: ICD-10-CM

## 2020-05-05 DIAGNOSIS — A41.9 SEPSIS, UNSPECIFIED ORGANISM: ICD-10-CM

## 2020-05-05 DIAGNOSIS — D69.6 THROMBOCYTOPENIA, UNSPECIFIED: ICD-10-CM

## 2020-05-05 DIAGNOSIS — L89.152 PRESSURE ULCER OF SACRAL REGION, STAGE 2: ICD-10-CM

## 2020-05-05 DIAGNOSIS — E87.0 HYPEROSMOLALITY AND HYPERNATREMIA: ICD-10-CM

## 2020-05-05 DIAGNOSIS — R94.31 ABNORMAL ELECTROCARDIOGRAM [ECG] [EKG]: ICD-10-CM

## 2020-05-06 LAB
CULTURE RESULTS: SIGNIFICANT CHANGE UP
SPECIMEN SOURCE: SIGNIFICANT CHANGE UP

## 2020-12-21 PROBLEM — B37.3 CANDIDIASIS OF GENITALIA IN FEMALE: Status: RESOLVED | Noted: 2019-01-01 | Resolved: 2020-12-21

## 2025-07-19 NOTE — ED ADULT NURSE NOTE - NS ED NURSE DC INFO COMPLEXITY
Keep scheduled appointment, return if s/s of PTL noted per instructions, use pregnancy support belt for pelvic discomfort.  
Simple: Patient demonstrates quick and easy understanding